# Patient Record
Sex: MALE | Race: WHITE | Employment: OTHER | URBAN - METROPOLITAN AREA
[De-identification: names, ages, dates, MRNs, and addresses within clinical notes are randomized per-mention and may not be internally consistent; named-entity substitution may affect disease eponyms.]

---

## 2017-05-17 PROBLEM — Z12.5 SPECIAL SCREENING FOR MALIGNANT NEOPLASM OF PROSTATE: Status: ACTIVE | Noted: 2017-05-17

## 2017-05-17 PROBLEM — Z79.899 ENCOUNTER FOR LONG-TERM (CURRENT) USE OF MEDICATIONS: Status: ACTIVE | Noted: 2017-05-17

## 2017-05-17 PROBLEM — Z98.890 S/P COLONOSCOPY: Status: ACTIVE | Noted: 2017-05-17

## 2017-11-25 PROBLEM — Z12.5 PROSTATE CANCER SCREENING: Status: ACTIVE | Noted: 2017-05-17

## 2018-05-15 PROCEDURE — 84153 ASSAY OF PSA TOTAL: CPT | Performed by: INTERNAL MEDICINE

## 2018-11-29 ENCOUNTER — HOSPITAL ENCOUNTER (INPATIENT)
Facility: HOSPITAL | Age: 83
LOS: 1 days | Discharge: HOME | DRG: 440 | End: 2018-11-30
Attending: EMERGENCY MEDICINE | Admitting: HOSPITALIST
Payer: MEDICARE

## 2018-11-29 ENCOUNTER — APPOINTMENT (OUTPATIENT)
Dept: CT IMAGING | Facility: HOSPITAL | Age: 83
DRG: 440 | End: 2018-11-29
Attending: EMERGENCY MEDICINE
Payer: MEDICARE

## 2018-11-29 ENCOUNTER — APPOINTMENT (OUTPATIENT)
Dept: ULTRASOUND IMAGING | Facility: HOSPITAL | Age: 83
DRG: 440 | End: 2018-11-29
Attending: INTERNAL MEDICINE
Payer: MEDICARE

## 2018-11-29 ENCOUNTER — APPOINTMENT (OUTPATIENT)
Dept: GENERAL RADIOLOGY | Facility: HOSPITAL | Age: 83
DRG: 440 | End: 2018-11-29
Attending: EMERGENCY MEDICINE
Payer: MEDICARE

## 2018-11-29 DIAGNOSIS — K85.90 ACUTE PANCREATITIS, UNSPECIFIED COMPLICATION STATUS, UNSPECIFIED PANCREATITIS TYPE: Primary | ICD-10-CM

## 2018-11-29 PROCEDURE — 99285 EMERGENCY DEPT VISIT HI MDM: CPT

## 2018-11-29 PROCEDURE — 96361 HYDRATE IV INFUSION ADD-ON: CPT

## 2018-11-29 PROCEDURE — 71045 X-RAY EXAM CHEST 1 VIEW: CPT | Performed by: EMERGENCY MEDICINE

## 2018-11-29 PROCEDURE — 96360 HYDRATION IV INFUSION INIT: CPT

## 2018-11-29 PROCEDURE — 81001 URINALYSIS AUTO W/SCOPE: CPT | Performed by: EMERGENCY MEDICINE

## 2018-11-29 PROCEDURE — 76700 US EXAM ABDOM COMPLETE: CPT | Performed by: INTERNAL MEDICINE

## 2018-11-29 PROCEDURE — 84478 ASSAY OF TRIGLYCERIDES: CPT | Performed by: INTERNAL MEDICINE

## 2018-11-29 PROCEDURE — 83690 ASSAY OF LIPASE: CPT | Performed by: EMERGENCY MEDICINE

## 2018-11-29 PROCEDURE — 80053 COMPREHEN METABOLIC PANEL: CPT | Performed by: EMERGENCY MEDICINE

## 2018-11-29 PROCEDURE — 74177 CT ABD & PELVIS W/CONTRAST: CPT | Performed by: EMERGENCY MEDICINE

## 2018-11-29 PROCEDURE — 84484 ASSAY OF TROPONIN QUANT: CPT | Performed by: EMERGENCY MEDICINE

## 2018-11-29 PROCEDURE — 93010 ELECTROCARDIOGRAM REPORT: CPT

## 2018-11-29 PROCEDURE — 93005 ELECTROCARDIOGRAM TRACING: CPT

## 2018-11-29 PROCEDURE — 85025 COMPLETE CBC W/AUTO DIFF WBC: CPT | Performed by: EMERGENCY MEDICINE

## 2018-11-29 RX ORDER — ONDANSETRON 2 MG/ML
4 INJECTION INTRAMUSCULAR; INTRAVENOUS EVERY 6 HOURS PRN
Status: DISCONTINUED | OUTPATIENT
Start: 2018-11-29 | End: 2018-12-01

## 2018-11-29 RX ORDER — ONDANSETRON 2 MG/ML
4 INJECTION INTRAMUSCULAR; INTRAVENOUS EVERY 4 HOURS PRN
Status: DISCONTINUED | OUTPATIENT
Start: 2018-11-29 | End: 2018-12-01

## 2018-11-29 RX ORDER — SODIUM CHLORIDE 9 MG/ML
INJECTION, SOLUTION INTRAVENOUS CONTINUOUS
Status: DISCONTINUED | OUTPATIENT
Start: 2018-11-29 | End: 2018-12-01

## 2018-11-29 RX ORDER — PANTOPRAZOLE SODIUM 40 MG/1
20 TABLET, DELAYED RELEASE ORAL
COMMUNITY
End: 2021-08-10

## 2018-11-29 RX ORDER — ACETAMINOPHEN 325 MG/1
650 TABLET ORAL EVERY 4 HOURS PRN
Status: DISCONTINUED | OUTPATIENT
Start: 2018-11-29 | End: 2018-12-01

## 2018-11-29 RX ORDER — SODIUM CHLORIDE 9 MG/ML
INJECTION, SOLUTION INTRAVENOUS ONCE
Status: COMPLETED | OUTPATIENT
Start: 2018-11-29 | End: 2018-11-29

## 2018-11-29 RX ORDER — METOCLOPRAMIDE HYDROCHLORIDE 5 MG/ML
5 INJECTION INTRAMUSCULAR; INTRAVENOUS EVERY 8 HOURS PRN
Status: DISCONTINUED | OUTPATIENT
Start: 2018-11-29 | End: 2018-12-01

## 2018-11-29 RX ORDER — HYDROCODONE BITARTRATE AND ACETAMINOPHEN 5; 325 MG/1; MG/1
2 TABLET ORAL EVERY 4 HOURS PRN
Status: DISCONTINUED | OUTPATIENT
Start: 2018-11-29 | End: 2018-12-01

## 2018-11-29 RX ORDER — MELATONIN
200 DAILY
Status: DISCONTINUED | OUTPATIENT
Start: 2018-11-29 | End: 2018-12-01

## 2018-11-29 RX ORDER — PANTOPRAZOLE SODIUM 20 MG/1
20 TABLET, DELAYED RELEASE ORAL
Status: DISCONTINUED | OUTPATIENT
Start: 2018-11-30 | End: 2018-12-01

## 2018-11-29 RX ORDER — POLYETHYLENE GLYCOL 3350 17 G/17G
17 POWDER, FOR SOLUTION ORAL DAILY PRN
Status: DISCONTINUED | OUTPATIENT
Start: 2018-11-29 | End: 2018-12-01

## 2018-11-29 RX ORDER — LISINOPRIL 20 MG/1
40 TABLET ORAL DAILY
COMMUNITY
End: 2019-06-17

## 2018-11-29 RX ORDER — ASPIRIN 81 MG/1
81 TABLET, CHEWABLE ORAL NIGHTLY
Status: DISCONTINUED | OUTPATIENT
Start: 2018-11-29 | End: 2018-12-01

## 2018-11-29 RX ORDER — HEPARIN SODIUM 5000 [USP'U]/ML
5000 INJECTION, SOLUTION INTRAVENOUS; SUBCUTANEOUS EVERY 12 HOURS SCHEDULED
Status: DISCONTINUED | OUTPATIENT
Start: 2018-11-29 | End: 2018-12-01

## 2018-11-29 RX ORDER — BISACODYL 10 MG
10 SUPPOSITORY, RECTAL RECTAL
Status: DISCONTINUED | OUTPATIENT
Start: 2018-11-29 | End: 2018-12-01

## 2018-11-29 RX ORDER — ONDANSETRON 2 MG/ML
INJECTION INTRAMUSCULAR; INTRAVENOUS
Status: COMPLETED
Start: 2018-11-29 | End: 2018-11-29

## 2018-11-29 RX ORDER — MORPHINE SULFATE 4 MG/ML
0.5 INJECTION, SOLUTION INTRAMUSCULAR; INTRAVENOUS EVERY 4 HOURS PRN
Status: DISCONTINUED | OUTPATIENT
Start: 2018-11-29 | End: 2018-12-01

## 2018-11-29 RX ORDER — METOPROLOL SUCCINATE 50 MG/1
50 TABLET, EXTENDED RELEASE ORAL DAILY
Status: DISCONTINUED | OUTPATIENT
Start: 2018-11-29 | End: 2018-12-01

## 2018-11-29 RX ORDER — LEVOTHYROXINE SODIUM 0.05 MG/1
50 TABLET ORAL
Status: DISCONTINUED | OUTPATIENT
Start: 2018-11-30 | End: 2018-12-01

## 2018-11-29 RX ORDER — HYDROCODONE BITARTRATE AND ACETAMINOPHEN 5; 325 MG/1; MG/1
1 TABLET ORAL EVERY 4 HOURS PRN
Status: DISCONTINUED | OUTPATIENT
Start: 2018-11-29 | End: 2018-12-01

## 2018-11-29 RX ORDER — SODIUM CHLORIDE 9 MG/ML
INJECTION, SOLUTION INTRAVENOUS CONTINUOUS
Status: ACTIVE | OUTPATIENT
Start: 2018-11-29 | End: 2018-11-29

## 2018-11-29 RX ORDER — LISINOPRIL 40 MG/1
40 TABLET ORAL DAILY
Status: DISCONTINUED | OUTPATIENT
Start: 2018-11-29 | End: 2018-12-01

## 2018-11-29 RX ORDER — SODIUM PHOSPHATE, DIBASIC AND SODIUM PHOSPHATE, MONOBASIC 7; 19 G/133ML; G/133ML
1 ENEMA RECTAL ONCE AS NEEDED
Status: DISCONTINUED | OUTPATIENT
Start: 2018-11-29 | End: 2018-12-01

## 2018-11-29 RX ORDER — METOCLOPRAMIDE HYDROCHLORIDE 5 MG/ML
10 INJECTION INTRAMUSCULAR; INTRAVENOUS EVERY 8 HOURS PRN
Status: DISCONTINUED | OUTPATIENT
Start: 2018-11-29 | End: 2018-11-29

## 2018-11-29 NOTE — ED PROVIDER NOTES
Patient Seen in: [facility] Emergency Department    History   Patient presents with:  Abdomen/Flank Pain (GI/)  Syncope (cardiovascular, neurologic)    Stated Complaint: abd pain near syncope          80year-old male comes to the hospital the com OTHER      trigger finger release x 2 procedures   • SKIN SURGERY  02/26/2018    Exc - atyp nevus (mod-severe dysplasia) - R paramedian lower mid back           Social History    Tobacco Use      Smoking status: Former Smoker        Packs/day: 1.00 following components:    Glucose 113 (*)     All other components within normal limits   URINALYSIS WITH CULTURE REFLEX - Abnormal; Notable for the following components:    Spec Gravity 1.033 (*)     Ketones Urine 20  (*)     Blood Urine Small (*)     RBC 88cc of Omnipaque 350  FINDINGS:  LUNG BASE:  Atelectasis. Calcified granuloma measuring 5 mm in the left lower lobe LIVER:  Homogeneous enhancement. BILIARY:  No biliary ductal dilatation. PANCREAS:  Homogeneous enhancement.   Round glass inflammatory ch (cpt=71045)    Result Date: 11/29/2018  PROCEDURE:  XR CHEST AP PORTABLE  (CPT=71045)  TECHNIQUE:  AP chest radiograph was obtained. COMPARISON:  MARCELL SHELTON CHEST AP PORTABLE  (CPT=71010), 1/12/2015, 13:11.   JANET, CHEST PA   LATERAL, 12/07/2012, 1

## 2018-11-29 NOTE — CONSULTS
Long Hearn MD    Department of Gastroenterology  [facility]    Dorinda Espinal.  Patient Status:  Inpatient    1932 MRN [id number]   [facility] 0SW-A Attending Wes Lo, [facility] splenic flexure of colon. SPLEEN:  Normal caliber. KIDNEYS:  No hydronephrosis.   There are bilateral renal cysts with slight perinephric soft tissue stranding bilaterally   ADRENALS:  Normal.  AORTA/VASCULAR:  Dense aortoiliac calcification No aneurysm Esophagogastroduodenoscopy, Colonoscopy  SEDATION MEDICATIONS: 5 mg of versed, 50 mcg of fentanyl given in divided doses per protocol  PREPROCEDURE ASSESSMENT: The indication for this procedure is to assess for GERD and Colon cancer screening.  The patient or graft    • Esophageal reflux    • GERD    • Heart attack ([facility] [address] 1994   • High blood pressure    • High cholesterol    • HYPERLIPIDEMIA    • HYPERTENSION    • HYPOTHYROIDISM    • OSTEOPENIA    • SINUSITIS    • Stroke [facility]) jan 2015    tia   • Unspecified mg, Oral, Q4H PRN **OR** HYDROcodone-acetaminophen (NORCO) 5-325 MG per tab 1 tablet, 1 tablet, Oral, Q4H PRN **OR** HYDROcodone-acetaminophen (NORCO) 5-325 MG per tab 2 tablet, 2 tablet, Oral, Q4H PRN  •  ondansetron HCl (ZOFRAN) injection 4 mg, 4 mg, Int change in hair or nails. Bone/joint: No joint pain or inflammation  Heme/Lymphatic: Denies easy bruising, anemia, excessive bleeding, enlarging or painful lymph nodes.   Allergy: Denies medication allergy, latex/rubber allergy, anaphylactic or other reacti gallstones. 3. Check triglycerides. 4. If above negative, will check mrcp / mri pancreas to rule out pancreatic malignancy. Thank you for allowing to participate in the care of this patient.     Vanessa Rader  11/29/2018  5:05 PM

## 2018-11-29 NOTE — ED INITIAL ASSESSMENT (HPI)
Pt here via ems after dispatch called EMS for a stroke. Pt developed weakness and lowered himself to floor and urinated. EMS stating fast score negative. Pt here c/o LUQ pain onset several days ago.  Denies NVD

## 2018-11-29 NOTE — PLAN OF CARE
Patient/Family Goals    • Patient/Family Long Term Goal Progressing    • Patient/Family Short Term Goal Progressing          ivf  NPO  Iv morphine for pain ok with dr. Felipa Bloom  Lipase and labs in am  GI consult called  Heparin SQ

## 2018-11-29 NOTE — H&P
[facility] Hospitalist H&P       CC:  abdominal pain    PCP: Eduardo Rios MD    History of Present Illness: Pt is an 81 yo with CAD, HTN/HL, hypothyroidism, GERD, who is admitted for abdominal pain.   Says for the past 1-2 days, has been having dull const Comment:hypotension  Sulphadimidine Sodi*    OTHER (SEE COMMENTS)    Comment:Unsure reaction     Home Medications:    Outpatient Medications Marked as Taking for the [facility]29/18 [facility] Encounter):  Pantoprazole Sodium 40 MG Oral Tab EC Take 20 mg General:  Alert, NAD, appears stated age   Head:  Normocephalic, without obvious abnormality, atraumatic. Eyes:  Sclera anicteric,  EOMs intact. Lids wnl.  PE   Ears, nose, throat:  external ears and nose within normal limits, hearing intact       Neck: 11/29/2018 at 10:17     Approved by: Eamon Burris MD            Xr Chest Ap Portable  (cpt=71045)    Result Date: 11/29/2018  PROCEDURE:  XR CHEST AP PORTABLE  (CPT=71045)  TECHNIQUE:  AP chest radiograph was obtained.   COMPARISON:  [facility] , XR CHEST AP

## 2018-11-29 NOTE — PROGRESS NOTES
[facility] Pharmacy Note:  Renal Dose Adjustment for Metoclopramide (REGLAN)    Irasema Lou. has been prescribed metoclopramide (REGLAN) 10 mg every 8 hours as needed for N/V. Estimated Creatinine Clearance: 37.6 mL/min (based on SCr of 1.09 mg/dL).     Hi

## 2018-11-29 NOTE — ED NOTES
Patient is resting comfortably. denies current weakness stating he has been lying in bed and is unsure if he feels weak

## 2018-11-30 ENCOUNTER — APPOINTMENT (OUTPATIENT)
Dept: MRI IMAGING | Facility: HOSPITAL | Age: 83
DRG: 440 | End: 2018-11-30
Attending: INTERNAL MEDICINE
Payer: MEDICARE

## 2018-11-30 VITALS
OXYGEN SATURATION: 94 % | HEIGHT: 62 IN | WEIGHT: 159 LBS | RESPIRATION RATE: 16 BRPM | SYSTOLIC BLOOD PRESSURE: 152 MMHG | TEMPERATURE: 98 F | HEART RATE: 84 BPM | DIASTOLIC BLOOD PRESSURE: 77 MMHG | BODY MASS INDEX: 29.26 KG/M2

## 2018-11-30 PROCEDURE — 83690 ASSAY OF LIPASE: CPT | Performed by: HOSPITALIST

## 2018-11-30 PROCEDURE — 80053 COMPREHEN METABOLIC PANEL: CPT | Performed by: HOSPITALIST

## 2018-11-30 PROCEDURE — 74183 MRI ABD W/O CNTR FLWD CNTR: CPT | Performed by: INTERNAL MEDICINE

## 2018-11-30 PROCEDURE — 97162 PT EVAL MOD COMPLEX 30 MIN: CPT

## 2018-11-30 PROCEDURE — A9575 INJ GADOTERATE MEGLUMI 0.1ML: HCPCS

## 2018-11-30 PROCEDURE — 83735 ASSAY OF MAGNESIUM: CPT | Performed by: HOSPITALIST

## 2018-11-30 PROCEDURE — 85025 COMPLETE CBC W/AUTO DIFF WBC: CPT | Performed by: HOSPITALIST

## 2018-11-30 RX ORDER — TRAMADOL HYDROCHLORIDE 50 MG/1
50 TABLET ORAL EVERY 6 HOURS PRN
Status: DISCONTINUED | OUTPATIENT
Start: 2018-11-30 | End: 2018-12-01

## 2018-11-30 NOTE — PROGRESS NOTES
[facility]  Progress Note    Guillermo Naranjo.  Patient Status:  Inpatient    1932 MRN [id number]   [facility] 0SW-A Attending Chriss Sheehan, *   Hosp Day # 1 PCP Yara Guaman MD     Subjective:  No further abdominal com Coronary atherosclerosis of unspecified type of vessel, native or graft    • Esophageal reflux    • GERD    • Heart attack ([facility] [address] 1994   • High blood pressure    • High cholesterol    • HYPERLIPIDEMIA    • HYPERTENSION    • HYPOTHYROIDISM    • OSTEOPENIA CREATSERUM 1.09 11/30/2018    BUN 15 11/30/2018     11/30/2018    K 4.2 11/30/2018     11/30/2018    CO2 27.0 11/30/2018    GLU 53 11/30/2018    CA 8.9 11/30/2018    ALB 2.9 11/30/2018    ALKPHO 53 11/30/2018    BILT 1.6 11/30/2018    TP 6.3 11 MRCP ordered. 2. Further recommendations pending above.     Melissa Hull  11/30/2018  10:45 AM

## 2018-11-30 NOTE — PROGRESS NOTES
Received patient this am alert and oriented,very anxious about timing of MRI and pending discharge.   Lungs clear bilaterally  On RA  Up to bathroom with stand by assist  No complaints of pain  Tolerating clears,patient refusing to advance diet until after

## 2018-11-30 NOTE — PLAN OF CARE
Assumed care for this pt 1930. Pt alert and oriented x4. Pt npo at midnight with anticipation of possible to procedure. Pt denies any pain at this time, pt up with stand by assist to toilet.  No other complaints at this time will continue to follow

## 2018-11-30 NOTE — PROGRESS NOTES
[facility] Hospitalist Progress Note     PCP: Pedrito Barboza MD    CC:  Follow up    SUBJECTIVE:   Pt sitting up in bed, no complaints of abdominal pain currently. Says he is frustrate that he has to wait for MRI/MRCP when he wants to go home.   Does not want to • lisinopril  40 mg Oral Daily   • Metoprolol Succinate ER  50 mg Oral Daily   • Pantoprazole Sodium  20 mg Oral QAM AC     • sodium chloride 125 mL/hr at 11/30/18 0200     ondansetron HCl, acetaminophen **OR** HYDROcodone-acetaminophen **OR** HYDROcodon

## 2018-11-30 NOTE — PHYSICAL THERAPY NOTE
PHYSICAL THERAPY QUICK EVALUATION - INPATIENT    Room Number: 8502/0710-S  Evaluation Date: 11/30/2018  Presenting Problem: acute pancreatitis  Physician Order: PT Eval and Treat    Problem List  Principal Problem:    Acute pancreatitis, unspecified comp BEARING RESTRICTION                   PAIN ASSESSMENT  Ratin         RANGE OF MOTION AND STRENGTH ASSESSMENT  Upper extremity ROM and strength are within functional limits     Lower extremity ROM is within functional limits     Lower extremity strength comorbidities and personal factors impacting therapy include none. Functional outcome measures completed include [facility]. Based on this evaluation, patient's clinical presentation is evolving and overall evaluation complexity is considered moderate.   PT Simone Fang

## 2019-01-28 ENCOUNTER — APPOINTMENT (OUTPATIENT)
Dept: GENERAL RADIOLOGY | Facility: HOSPITAL | Age: 84
DRG: 087 | End: 2019-01-28
Attending: EMERGENCY MEDICINE
Payer: MEDICARE

## 2019-01-28 ENCOUNTER — HOSPITAL ENCOUNTER (INPATIENT)
Facility: HOSPITAL | Age: 84
LOS: 1 days | Discharge: HOME | DRG: 087 | End: 2019-01-29
Attending: EMERGENCY MEDICINE | Admitting: INTERNAL MEDICINE
Payer: MEDICARE

## 2019-01-28 ENCOUNTER — APPOINTMENT (OUTPATIENT)
Dept: CT IMAGING | Facility: HOSPITAL | Age: 84
DRG: 087 | End: 2019-01-28
Attending: EMERGENCY MEDICINE
Payer: MEDICARE

## 2019-01-28 DIAGNOSIS — S06.5X9A ACUTE SUBDURAL HEMATOMA (HCC): ICD-10-CM
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
## 2019-01-28 PROBLEM — S06.5XAA ACUTE SUBDURAL HEMATOMA (HCC): Status: ACTIVE | Noted: 2019-01-28

## 2019-01-28 PROBLEM — S06.5XAA ACUTE SUBDURAL HEMATOMA: Status: ACTIVE | Noted: 2019-01-28

## 2019-01-28 LAB
ALBUMIN SERPL-MCNC: 4.1 G/DL (ref 3.1–4.5)
ALBUMIN/GLOB SERPL: 1.2 {RATIO} (ref 1–2)
ALP LIVER SERPL-CCNC: 59 U/L (ref 45–117)
ALT SERPL-CCNC: 28 U/L (ref 17–63)
ANION GAP SERPL CALC-SCNC: 8 MMOL/L (ref 0–18)
APTT PPP: 30.2 SECONDS (ref 26.1–34.6)
AST SERPL-CCNC: 35 U/L (ref 15–41)
BASOPHILS # BLD AUTO: 0.06 X10(3) UL (ref 0–0.1)
BASOPHILS NFR BLD AUTO: 0.7 %
BILIRUB SERPL-MCNC: 1.4 MG/DL (ref 0.1–2)
BUN BLD-MCNC: 12 MG/DL (ref 8–20)
BUN/CREAT SERPL: 11.3 (ref 10–20)
CALCIUM BLD-MCNC: 10 MG/DL (ref 8.3–10.3)
CHLORIDE SERPL-SCNC: 105 MMOL/L (ref 101–111)
CO2 SERPL-SCNC: 26 MMOL/L (ref 22–32)
CREAT BLD-MCNC: 1.06 MG/DL (ref 0.7–1.3)
EOSINOPHIL # BLD AUTO: 0.21 X10(3) UL (ref 0–0.3)
EOSINOPHIL NFR BLD AUTO: 2.5 %
ERYTHROCYTE [DISTWIDTH] IN BLOOD BY AUTOMATED COUNT: 16.1 % (ref 11.5–16)
GLOBULIN PLAS-MCNC: 3.3 G/DL (ref 2.8–4.4)
GLUCOSE BLD-MCNC: 99 MG/DL (ref 70–99)
HCT VFR BLD AUTO: 46.9 % (ref 37–53)
HGB BLD-MCNC: 15 G/DL (ref 13–17)
IMMATURE GRANULOCYTE COUNT: 0.03 X10(3) UL (ref 0–1)
IMMATURE GRANULOCYTE RATIO %: 0.4 %
INR BLD: 1.01 (ref 0.9–1.1)
LYMPHOCYTES # BLD AUTO: 1.42 X10(3) UL (ref 0.9–4)
LYMPHOCYTES NFR BLD AUTO: 16.8 %
M PROTEIN MFR SERPL ELPH: 7.4 G/DL (ref 6.4–8.2)
MCH RBC QN AUTO: 23 PG (ref 27–33.2)
MCHC RBC AUTO-ENTMCNC: 32 G/DL (ref 31–37)
MCV RBC AUTO: 72 FL (ref 80–99)
MONOCYTES # BLD AUTO: 0.78 X10(3) UL (ref 0.1–1)
MONOCYTES NFR BLD AUTO: 9.2 %
NEUTROPHIL ABS PRELIM: 5.96 X10 (3) UL (ref 1.3–6.7)
NEUTROPHILS # BLD AUTO: 5.96 X10(3) UL (ref 1.3–6.7)
NEUTROPHILS NFR BLD AUTO: 70.4 %
OSMOLALITY SERPL CALC.SUM OF ELEC: 288 MOSM/KG (ref 275–295)
PLATELET # BLD AUTO: 177 10(3)UL (ref 150–450)
POTASSIUM SERPL-SCNC: 4.5 MMOL/L (ref 3.6–5.1)
PSA SERPL DL<=0.01 NG/ML-MCNC: 13.7 SECONDS (ref 12.4–14.7)
RBC # BLD AUTO: 6.51 X10(6)UL (ref 3.8–5.8)
RED CELL DISTRIBUTION WIDTH-SD: 37 FL (ref 35.1–46.3)
SODIUM SERPL-SCNC: 139 MMOL/L (ref 136–144)
WBC # BLD AUTO: 8.5 X10(3) UL (ref 4–13)

## 2019-01-28 PROCEDURE — 72040 X-RAY EXAM NECK SPINE 2-3 VW: CPT | Performed by: EMERGENCY MEDICINE

## 2019-01-28 PROCEDURE — 70450 CT HEAD/BRAIN W/O DYE: CPT | Performed by: EMERGENCY MEDICINE

## 2019-01-28 PROCEDURE — 99223 1ST HOSP IP/OBS HIGH 75: CPT | Performed by: NEUROLOGICAL SURGERY

## 2019-01-28 RX ORDER — LISINOPRIL 40 MG/1
40 TABLET ORAL DAILY
Status: DISCONTINUED | OUTPATIENT
Start: 2019-01-29 | End: 2019-01-29

## 2019-01-28 RX ORDER — SODIUM CHLORIDE 9 MG/ML
INJECTION, SOLUTION INTRAVENOUS CONTINUOUS
Status: DISCONTINUED | OUTPATIENT
Start: 2019-01-28 | End: 2019-01-29

## 2019-01-28 RX ORDER — ONDANSETRON 2 MG/ML
4 INJECTION INTRAMUSCULAR; INTRAVENOUS EVERY 6 HOURS PRN
Status: DISCONTINUED | OUTPATIENT
Start: 2019-01-28 | End: 2019-01-29

## 2019-01-28 RX ORDER — PANTOPRAZOLE SODIUM 20 MG/1
20 TABLET, DELAYED RELEASE ORAL
Status: DISCONTINUED | OUTPATIENT
Start: 2019-01-29 | End: 2019-01-29

## 2019-01-28 RX ORDER — MELATONIN
200 DAILY
Status: DISCONTINUED | OUTPATIENT
Start: 2019-01-29 | End: 2019-01-29

## 2019-01-28 RX ORDER — METOPROLOL SUCCINATE 50 MG/1
50 TABLET, EXTENDED RELEASE ORAL DAILY
Status: DISCONTINUED | OUTPATIENT
Start: 2019-01-29 | End: 2019-01-29

## 2019-01-28 RX ORDER — METOCLOPRAMIDE HYDROCHLORIDE 5 MG/ML
5 INJECTION INTRAMUSCULAR; INTRAVENOUS EVERY 8 HOURS PRN
Status: DISCONTINUED | OUTPATIENT
Start: 2019-01-28 | End: 2019-01-29

## 2019-01-28 RX ORDER — AMLODIPINE BESYLATE 2.5 MG/1
2.5 TABLET ORAL DAILY
Status: DISCONTINUED | OUTPATIENT
Start: 2019-01-29 | End: 2019-01-29

## 2019-01-28 RX ORDER — LEVOTHYROXINE SODIUM 0.07 MG/1
75 TABLET ORAL
Status: DISCONTINUED | OUTPATIENT
Start: 2019-01-28 | End: 2019-01-29

## 2019-01-28 RX ORDER — ACETAMINOPHEN 325 MG/1
650 TABLET ORAL EVERY 6 HOURS PRN
Status: DISCONTINUED | OUTPATIENT
Start: 2019-01-28 | End: 2019-01-29

## 2019-01-28 NOTE — H&P
[facility] Hospitalist H&P       CC: Patient presents with:  Fall (musculoskeletal, neurologic)       PCP: Rosario Rios MD    History of Present Illness:  Mr. Airam Barriga is an 79 yo male with PMH of CAD, GERD, HTN, HLD, hypothyroidism, prior TIA who present mid back        ALL:    Hydrochlorothiazide     RASH    Comment:Skin rash with thiazide diuretic  Morphine [Morphine *    OTHER (SEE COMMENTS)    Comment:hypotension  Sulphadimidine Sodi*    OTHER (SEE COMMENTS)    Comment:Unsure reaction     Home Medicati (5' 2\")   Wt 160 lb (72.6 kg)   SpO2 98%   BMI 29.26 kg/m²     BP Readings from Last 3 Encounters:  01/28/19 : 151/56  01/21/19 : 130/60  12/20/18 : 130/60    Wt Readings from Last 3 Encounters:  01/28/19 : 160 lb (72.6 kg)  12/20/18 : 160 lb (72.6 kg)  1 high density along the right tentorium with a more focal, 1.3 x 1.0 x 0.6 cm collection most consistent with a subdural hematoma. There is no midline shift. No acute intracranial hemorrhage.  Periventricular and subcortical low attenuation are nonspecific who presented to the ED with fall.     # SDH  - Neurosurgery c/s  - Neuro checks q 4 hours  - PT/OT  - Repeat CT head in AM --> sooner if change in neuro status  - No ASA for at least 5 days after fall    # Hx CAD / HTN  - Continue metoprolol  - Continue li

## 2019-01-28 NOTE — CONSULTS
[facility] Pharmacy Note:  Renal Dose Adjustment for Metoclopramide (REGLAN)    Joyce Anderson. has been prescribed Metoclopramide (REGLAN) 10 mg every 8 hours as needed for n/v.    Estimated Creatinine Clearance: 38.6 mL/min (based on SCr of 1.06 mg/dL).     Hi

## 2019-01-28 NOTE — ED PROVIDER NOTES
Patient Seen in: [facility] Emergency Department    History   Patient presents with:  Fall (musculoskeletal, neurologic)    Stated Complaint: fall    HPI    Patient presents to the emergency department after he had a fall yesterday while snowblowing OTHER      trigger finger release x 2 procedures   • SKIN SURGERY  02/26/2018    Exc - atyp nevus (mod-severe dysplasia) - R paramedian lower mid back           Social History    Tobacco Use      Smoking status: Former Smoker        Packs/day: 1.00 Abnormality         Status                     ---------                               -----------         ------                     CBC W/ DIFFERENTIAL[[id number]]                              In process                   Pleas hematoma     measuring approximately 6 mm in depth along the right tentorium as     detailed above. Critical findings discussed with the ED MD, Dr. Latonia Casas at approximately     1155 hr on 1/28/2019. Read back performed.          2. Findings most con diagnosis)  Acute subdural hematoma (HCC)    Disposition:  Admit  1/28/2019 12:44 pm    Follow-up:  No follow-up provider specified.       Medications Prescribed:  Current Discharge Medication List        Present on Admission  Date Reviewed: 12/20/2018

## 2019-01-28 NOTE — CONSULTS
[facility]  Neurosurgery Consult    Carlipapi Eedn.  Patient Status:  Emergency    1932 MRN [id number]   Location [address] [facility] Attending Az Zafar MD   Hosp Day # 0 PCP John Jaeger MD     [facility] ESOPHAGOGASTRODUODENOSCOPY (EGD) N/A 10/27/2014    Performed by Shannon Krishnan MD at [facility] ENDOSCOPY   • OTHER      trigger finger release x 2 procedures   • SKIN SURGERY  02/26/2018    Exc - atyp nevus (mod-severe dysplasia) - R paramedian lower mid back CREATSERUM 1.06 01/28/2019    BUN 12 01/28/2019     01/28/2019    K 4.5 01/28/2019     01/28/2019    CO2 26.0 01/28/2019    GLU 99 01/28/2019    CA 10.0 01/28/2019    ALB 4.1 01/28/2019    ALKPHO 59 01/28/2019    BILT 1.4 01/28/2019    TP 7.4 0 this patient and agree with the findings documented above. Fall the day before admission with mild headache, unsteadiness when standing. Small tentorial SDH without significant mass effect, no MLS, basal cisterns patent.   Repeat CT in AM.     Amanda Clark

## 2019-01-28 NOTE — ED INITIAL ASSESSMENT (HPI)
Pt here s/p fall yesterday. Pt slipped in driveway while snow blowing. Pt fell on his side. Pt notes pain in head and neck.

## 2019-01-29 ENCOUNTER — APPOINTMENT (OUTPATIENT)
Dept: CT IMAGING | Facility: HOSPITAL | Age: 84
DRG: 087 | End: 2019-01-29
Attending: NURSE PRACTITIONER
Payer: MEDICARE

## 2019-01-29 VITALS
BODY MASS INDEX: 29.44 KG/M2 | HEIGHT: 62 IN | WEIGHT: 160 LBS | RESPIRATION RATE: 15 BRPM | DIASTOLIC BLOOD PRESSURE: 52 MMHG | OXYGEN SATURATION: 97 % | SYSTOLIC BLOOD PRESSURE: 147 MMHG | HEART RATE: 62 BPM | TEMPERATURE: 98 F

## 2019-01-29 LAB
ALBUMIN SERPL-MCNC: 3.2 G/DL (ref 3.1–4.5)
ALBUMIN/GLOB SERPL: 1.1 {RATIO} (ref 1–2)
ALP LIVER SERPL-CCNC: 49 U/L (ref 45–117)
ALT SERPL-CCNC: 20 U/L (ref 17–63)
ANION GAP SERPL CALC-SCNC: 4 MMOL/L (ref 0–18)
AST SERPL-CCNC: 20 U/L (ref 15–41)
BILIRUB SERPL-MCNC: 1 MG/DL (ref 0.1–2)
BUN BLD-MCNC: 13 MG/DL (ref 8–20)
BUN/CREAT SERPL: 11.6 (ref 10–20)
CALCIUM BLD-MCNC: 8.7 MG/DL (ref 8.3–10.3)
CHLORIDE SERPL-SCNC: 107 MMOL/L (ref 101–111)
CO2 SERPL-SCNC: 29 MMOL/L (ref 22–32)
CREAT BLD-MCNC: 1.12 MG/DL (ref 0.7–1.3)
GLOBULIN PLAS-MCNC: 2.8 G/DL (ref 2.8–4.4)
GLUCOSE BLD-MCNC: 81 MG/DL (ref 70–99)
M PROTEIN MFR SERPL ELPH: 6 G/DL (ref 6.4–8.2)
OSMOLALITY SERPL CALC.SUM OF ELEC: 289 MOSM/KG (ref 275–295)
POTASSIUM SERPL-SCNC: 4.2 MMOL/L (ref 3.6–5.1)
SODIUM SERPL-SCNC: 140 MMOL/L (ref 136–144)

## 2019-01-29 PROCEDURE — 70450 CT HEAD/BRAIN W/O DYE: CPT | Performed by: NURSE PRACTITIONER

## 2019-01-29 PROCEDURE — 99223 1ST HOSP IP/OBS HIGH 75: CPT | Performed by: NEUROLOGICAL SURGERY

## 2019-01-29 NOTE — DISCHARGE SUMMARY
General Medicine Discharge Summary     Patient ID:  Lois Loss.  80year old  7/22/1932    Admit date: 1/28/2019    Discharge date and time: 1/29/19    Attending Physician: Kerry Crowell MD     Primary Care Physician: Scott Roca MD     Re MCG Oral Tab  Take 200 mcg by mouth daily. Multiple Vitamins-Minerals (TAB-A-MARCELLE MAXIMUM) Oral Tab  Take 1 tablet by mouth daily. Ezetimibe-Simvastatin (VYTORIN) 10-40 MG Oral Tab  Take 1 tablet by mouth nightly.       Follow-up with   PCP  Neurosurg

## 2019-01-29 NOTE — OCCUPATIONAL THERAPY NOTE
OCCUPATIONAL THERAPY QUICK EVALUATION - INPATIENT    Room Number: 7392/5596-V  Evaluation Date: 1/29/2019     Type of Evaluation: Quick Eval  Presenting Problem: fall, SDH    Physician Order: IP Consult to Occupational Therapy  Reason for Therapy:  ADL/IAD by Shannon Krishnan MD at [facility] ENDOSCOPY   • ESOPHAGOGASTRODUODENOSCOPY (EGD) N/A 10/27/2014    Performed by Shannon Krishnan MD at [facility] ENDOSCOPY   • OTHER      trigger finger release x 2 procedures   • SKIN SURGERY  02/26/2018    Exc - atyp nevus (mod-severe dysplasia ASSESSMENT  Supine to Sit : Independent  Sit to Stand: Independent    Skilled Therapy Provided: Independent with bed mobility, transfers, and ambulation without device. Pt completed toileting while standing independently.   Pt maintained dynamic standing b

## 2019-01-29 NOTE — PHYSICAL THERAPY NOTE
PHYSICAL THERAPY QUICK EVALUATION - INPATIENT    Room Number: 5662/5584-N  Evaluation Date: 1/29/2019  Presenting Problem: acute subdural hematoma; fell on ice using snowblower  Physician Order: PT Eval and Treat    Problem List  Principal Problem:     Fa Standard fall risk    WEIGHT BEARING RESTRICTION  Weight Bearing Restriction: None                PAIN ASSESSMENT  Ratin         RANGE OF MOTION AND STRENGTH ASSESSMENT  Upper extremity ROM and strength are within functional limits     Lower extremity light within reach; All patient questions and concerns addressed;SCDs in place;RN aware of session/findings    ASSESSMENT   Patient is a 80year old male admitted on 1/28/2019 for acute subdural hematoma from fall.   Pertinent comorbidities and personal fact

## 2019-01-29 NOTE — PLAN OF CARE
Assumed care @ 1400. A&Ox4, RA, SR, denies pain. Up with assist.  PT/OT will work with patient tomorrow. CTH showed acute subdural hematoma along right tentorium. Neuro's intact. Neurosurgery will not intervene at this time. Call light within reach.

## 2019-01-29 NOTE — PLAN OF CARE
NEUROLOGICAL - ADULT    • Achieves stable or improved neurological status Adequate for Discharge          All consults signed off on patient. Discharge AVS printed and reviewed with patient. He is instructed to hold his ASA for 5 days, resume on 2/5.  Deisi Cassidy

## 2019-01-29 NOTE — PLAN OF CARE
Assumed care at 1900. Alert and oriented x4. Telemetry monitor reading SR. IVF infusing assessed and maintained. Neuro checks remain unchanged. Fall precautions maintained per protocol. Plan for repeat CT head in the am at 0700.  Call light in reach at the

## 2019-01-29 NOTE — PROGRESS NOTES
[facility]  Neurosurgery Progress Note    Cash Cutter.  Patient Status:  Inpatient    1932 MRN [id number]   [facility] 7NE-A Attending Kya Brooks MD   [facility] Day # 1 PCP Nadia Prieto MD     Subjective:  Pt examined, male with a right tentorial SDH after a fall    Plan:  Discussed with  on rounds  No aspirin for 5 days after rounds  Ok from neurosurgery standpoint to discharge home  F/u 2 appt as scheduled      SILVERIO Dugan  THE [facility] Neuroscience [facility]

## 2019-01-29 NOTE — CM/SW NOTE
Pt is an 81 yo male admitted for acute subdural hematoma from falling on the ice outside. Pt lives with his wife in [place]. PT/OT recommending Home. Pt maybe d/c'd home today - no needs.       01/29/19 1400   CM/SW Screening   Referral Source Social

## 2019-01-30 NOTE — CM/SW NOTE
01/30/19 0900   Discharge disposition   Expected discharge disposition Home or Self   Name of [address] [place] [address] services after discharge None   Discharge transportation Private car

## 2019-02-27 ENCOUNTER — OFFICE VISIT (OUTPATIENT)
Dept: SURGERY | Facility: CLINIC | Age: 84
End: 2019-02-27
Payer: MEDICARE

## 2019-02-27 VITALS
SYSTOLIC BLOOD PRESSURE: 130 MMHG | WEIGHT: 160 LBS | HEIGHT: 63 IN | DIASTOLIC BLOOD PRESSURE: 78 MMHG | HEART RATE: 70 BPM | BODY MASS INDEX: 28.35 KG/M2
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
## 2019-02-27 DIAGNOSIS — S06.5X9A ACUTE SUBDURAL HEMATOMA (HCC): ICD-10-CM

## 2019-02-27 PROCEDURE — 99213 OFFICE O/P EST LOW 20 MIN: CPT | Performed by: PHYSICIAN ASSISTANT

## 2019-02-27 PROCEDURE — 1111F DSCHRG MED/CURRENT MED MERGE: CPT | Performed by: PHYSICIAN ASSISTANT

## 2019-02-27 NOTE — PROGRESS NOTES
Hospital follow up for subdural hematoma. . Pt states that he has been doing better since LOV.    Hospital date: 1/28/19

## 2019-02-27 NOTE — PROGRESS NOTES
Neurosurgery Clinic Visit  2019    Codion Tegan. PCP:  Ruby Bailey MD    1932 MRN [id number]       CC:  SDH F/u Hospital Visit 19    HPI:    [employer] is here for 1 month f/u after SDH. He is doing well.   He denies any headache o x 3 procedures     no stents   • CAROTID ENDARTERECTOMY Right     • CAROTID ENDARTERECTOMY Right 2/12/2015     Performed by Elma Joseph MD at [address] [place] [address] [address] [facility] Bilateral 2012     lens implants   • COLONOSCOPY   11/04   • COLONOSCOPY N/A 10/27/2014   Finger abduction     Right 5 5 5 5 5     Left 5 5 5 5 5   Lower extremity strength:        Iliospoas  Hamstrings  Quads  D-flexion  P-flexion     Right 5 5 5 5 5     Left 5 5 5 5 5         IMAGING:    CT Brain -     Persistent subdural hematoma along

## 2019-03-18 ENCOUNTER — APPOINTMENT (OUTPATIENT)
Dept: GENERAL RADIOLOGY | Facility: HOSPITAL | Age: 84
DRG: 027 | End: 2019-03-18
Attending: EMERGENCY MEDICINE
Payer: MEDICARE

## 2019-03-18 ENCOUNTER — HOSPITAL ENCOUNTER (INPATIENT)
Facility: HOSPITAL | Age: 84
LOS: 6 days | Discharge: HOME | DRG: 027 | End: 2019-03-25
Attending: EMERGENCY MEDICINE | Admitting: INTERNAL MEDICINE
Payer: MEDICARE

## 2019-03-18 DIAGNOSIS — I62.01 ACUTE ON CHRONIC INTRACRANIAL SUBDURAL HEMATOMA (HCC): Primary | ICD-10-CM

## 2019-03-18 DIAGNOSIS — I62.03 ACUTE ON CHRONIC INTRACRANIAL SUBDURAL HEMATOMA (HCC): Primary | ICD-10-CM

## 2019-03-18 PROCEDURE — 71045 X-RAY EXAM CHEST 1 VIEW: CPT | Performed by: EMERGENCY MEDICINE

## 2019-03-19 ENCOUNTER — APPOINTMENT (OUTPATIENT)
Dept: MRI IMAGING | Facility: HOSPITAL | Age: 84
DRG: 027 | End: 2019-03-19
Attending: EMERGENCY MEDICINE
Payer: MEDICARE

## 2019-03-19 ENCOUNTER — APPOINTMENT (OUTPATIENT)
Dept: ULTRASOUND IMAGING | Facility: HOSPITAL | Age: 84
DRG: 027 | End: 2019-03-19
Attending: NURSE PRACTITIONER
Payer: MEDICARE

## 2019-03-19 ENCOUNTER — APPOINTMENT (OUTPATIENT)
Dept: CT IMAGING | Facility: HOSPITAL | Age: 84
DRG: 027 | End: 2019-03-19
Attending: EMERGENCY MEDICINE
Payer: MEDICARE

## 2019-03-19 PROBLEM — I62.03 ACUTE ON CHRONIC INTRACRANIAL SUBDURAL HEMATOMA (HCC): Status: ACTIVE | Noted: 2019-03-19

## 2019-03-19 PROBLEM — I62.01 ACUTE ON CHRONIC INTRACRANIAL SUBDURAL HEMATOMA (HCC): Status: ACTIVE | Noted: 2019-03-19

## 2019-03-19 PROCEDURE — 99291 CRITICAL CARE FIRST HOUR: CPT | Performed by: NURSE PRACTITIONER

## 2019-03-19 PROCEDURE — 70553 MRI BRAIN STEM W/O & W/DYE: CPT | Performed by: EMERGENCY MEDICINE

## 2019-03-19 PROCEDURE — 99291 CRITICAL CARE FIRST HOUR: CPT | Performed by: OTHER

## 2019-03-19 PROCEDURE — 99222 1ST HOSP IP/OBS MODERATE 55: CPT | Performed by: NURSE PRACTITIONER

## 2019-03-19 PROCEDURE — 70549 MR ANGIOGRAPH NECK W/O&W/DYE: CPT | Performed by: EMERGENCY MEDICINE

## 2019-03-19 PROCEDURE — 70546 MR ANGIOGRAPH HEAD W/O&W/DYE: CPT | Performed by: EMERGENCY MEDICINE

## 2019-03-19 PROCEDURE — 93880 EXTRACRANIAL BILAT STUDY: CPT | Performed by: NURSE PRACTITIONER

## 2019-03-19 PROCEDURE — 95816 EEG AWAKE AND DROWSY: CPT | Performed by: OTHER

## 2019-03-19 PROCEDURE — 70450 CT HEAD/BRAIN W/O DYE: CPT | Performed by: EMERGENCY MEDICINE

## 2019-03-19 RX ORDER — FAMOTIDINE 20 MG/1
20 TABLET ORAL DAILY
Status: DISCONTINUED | OUTPATIENT
Start: 2019-03-19 | End: 2019-03-25

## 2019-03-19 RX ORDER — METOPROLOL SUCCINATE 50 MG/1
50 TABLET, EXTENDED RELEASE ORAL DAILY
Status: DISCONTINUED | OUTPATIENT
Start: 2019-03-19 | End: 2019-03-25

## 2019-03-19 RX ORDER — SENNOSIDES 8.6 MG
17.2 TABLET ORAL NIGHTLY
Status: DISCONTINUED | OUTPATIENT
Start: 2019-03-19 | End: 2019-03-25

## 2019-03-19 RX ORDER — BISACODYL 10 MG
10 SUPPOSITORY, RECTAL RECTAL
Status: DISCONTINUED | OUTPATIENT
Start: 2019-03-19 | End: 2019-03-25

## 2019-03-19 RX ORDER — METOCLOPRAMIDE HYDROCHLORIDE 5 MG/ML
5 INJECTION INTRAMUSCULAR; INTRAVENOUS EVERY 8 HOURS PRN
Status: DISCONTINUED | OUTPATIENT
Start: 2019-03-19 | End: 2019-03-25

## 2019-03-19 RX ORDER — LEVOTHYROXINE SODIUM 0.07 MG/1
75 TABLET ORAL DAILY
Status: DISCONTINUED | OUTPATIENT
Start: 2019-03-19 | End: 2019-03-25

## 2019-03-19 RX ORDER — DOCUSATE SODIUM 100 MG/1
100 CAPSULE, LIQUID FILLED ORAL 2 TIMES DAILY
Status: DISCONTINUED | OUTPATIENT
Start: 2019-03-19 | End: 2019-03-25

## 2019-03-19 RX ORDER — ONDANSETRON 2 MG/ML
4 INJECTION INTRAMUSCULAR; INTRAVENOUS EVERY 6 HOURS PRN
Status: DISCONTINUED | OUTPATIENT
Start: 2019-03-19 | End: 2019-03-25

## 2019-03-19 RX ORDER — LEVETIRACETAM 500 MG/1
500 TABLET ORAL 2 TIMES DAILY
Status: DISCONTINUED | OUTPATIENT
Start: 2019-03-19 | End: 2019-03-25

## 2019-03-19 RX ORDER — ACETAMINOPHEN 650 MG/1
650 SUPPOSITORY RECTAL EVERY 4 HOURS PRN
Status: DISCONTINUED | OUTPATIENT
Start: 2019-03-19 | End: 2019-03-25

## 2019-03-19 RX ORDER — PHENYLEPHRINE HCL IN 0.9% NACL 50MG/250ML
PLASTIC BAG, INJECTION (ML) INTRAVENOUS CONTINUOUS PRN
Status: DISCONTINUED | OUTPATIENT
Start: 2019-03-19 | End: 2019-03-19 | Stop reason: HOSPADM

## 2019-03-19 RX ORDER — SODIUM CHLORIDE 9 MG/ML
INJECTION, SOLUTION INTRAVENOUS CONTINUOUS
Status: DISCONTINUED | OUTPATIENT
Start: 2019-03-19 | End: 2019-03-23

## 2019-03-19 RX ORDER — FAMOTIDINE 10 MG/ML
20 INJECTION, SOLUTION INTRAVENOUS DAILY
Status: DISCONTINUED | OUTPATIENT
Start: 2019-03-19 | End: 2019-03-25

## 2019-03-19 RX ORDER — EZETIMIBE AND SIMVASTATIN 10; 40 MG/1; MG/1
1 TABLET ORAL NIGHTLY
Status: DISCONTINUED | OUTPATIENT
Start: 2019-03-19 | End: 2019-03-19 | Stop reason: ALTCHOICE

## 2019-03-19 RX ORDER — ACETAMINOPHEN 325 MG/1
650 TABLET ORAL EVERY 4 HOURS PRN
Status: DISCONTINUED | OUTPATIENT
Start: 2019-03-19 | End: 2019-03-25

## 2019-03-19 RX ORDER — LISINOPRIL 40 MG/1
40 TABLET ORAL DAILY
Status: DISCONTINUED | OUTPATIENT
Start: 2019-03-19 | End: 2019-03-25

## 2019-03-19 RX ORDER — LORAZEPAM 2 MG/ML
1 INJECTION INTRAMUSCULAR
Status: DISCONTINUED | OUTPATIENT
Start: 2019-03-19 | End: 2019-03-25

## 2019-03-19 RX ORDER — POLYETHYLENE GLYCOL 3350 17 G/17G
17 POWDER, FOR SOLUTION ORAL DAILY PRN
Status: DISCONTINUED | OUTPATIENT
Start: 2019-03-19 | End: 2019-03-25

## 2019-03-19 RX ORDER — LABETALOL HYDROCHLORIDE 5 MG/ML
10 INJECTION, SOLUTION INTRAVENOUS EVERY 10 MIN PRN
Status: COMPLETED | OUTPATIENT
Start: 2019-03-19 | End: 2019-03-24

## 2019-03-19 RX ORDER — SODIUM PHOSPHATE, DIBASIC AND SODIUM PHOSPHATE, MONOBASIC 7; 19 G/133ML; G/133ML
1 ENEMA RECTAL ONCE AS NEEDED
Status: DISCONTINUED | OUTPATIENT
Start: 2019-03-19 | End: 2019-03-25

## 2019-03-19 NOTE — OCCUPATIONAL THERAPY NOTE
OCCUPATIONAL THERAPY EVALUATION - INPATIENT     Room Number: 9829/8404-L  Evaluation Date: 3/19/2019  Type of Evaluation: Initial  Presenting Problem: transient LUE numbness and slurred speech, acute on chronic SDH    Physician Order: IP Consult to Michelle Szymanski lower mid back   • TONSILLECTOMY         OCCUPATIONAL PROFILE    HOME SITUATION  Type of Home: House  Home Layout: One level(with basement)  Lives With: Spouse    Toilet and Equipment: Standard height toilet  Shower/Tub and Equipment: Walk-in shower; Shower Opposition     Coordination - Rapid Alternating Movement: Symmetrical  Coordination - Finger Opposition: Symmetrical       ACTIVITY TOLERANCE                         O2 SATURATIONS                ACTIVITIES OF DAILY LIVING ASSESSMENT  AM-PAC ‘6-Clicks’ Inp above. Functional outcome measures completed include AMPAC, ROM, MMT. The AM-JASWINDER ' '6-Clicks' Inpatient Daily Activity Short Form was completed and  this patient  is demonstrating a  16% degree impairment in activities of daily living.  Research supports th Goal  Patient will be modified independent with left AROM HEP (home exercise program).     Additional Goals:  Pt will complete dynamometer strength testing  Pt will complete trailmaking test  Pt will complete clock draw

## 2019-03-19 NOTE — CONSULTS
[facility]  Neurosurgery Consult    Blessing Avalos.  Patient Status:  Inpatient    1932 MRN [id number]   [facility] 6NE-A Attending Ben Guevara MD   Hosp Day # 0 PCP Janeen Garcia MD     REASON FOR CONSULTATION: at [facility] [address] Bilateral 2012    lens implants   • COLONOSCOPY  11/04   • COLONOSCOPY N/A 10/27/2014    Performed by Pro Mcwilliams MD at [address] [place] [address] ENDOSCOPY   • ESOPHAGOGASTRODUODENOSCOPY (EGD) N/A 10/27/2014    Performed by Pro Mcwilliams MD at [address] [place] [address] ENDOSCOPY MAXIMUM) Oral Tab Take 1 tablet by mouth daily. Disp:  Rfl:  3/18/2019 at Unknown time   Ezetimibe-Simvastatin (VYTORIN) 10-40 MG Oral Tab Take 1 tablet by mouth nightly.  Disp:  Rfl:  3/18/2019 at Unknown time       Current Facility-Administered Medication orientated x 3. Speech fluent. Comprehension intact. PERRLA +3 brisk,  EOMI. Face is symmetrical. CN's GI. Sensation to light touch is intact bilaterally. Strengths 5/5 bilaterally. Gait deferred. Rapid finger and foot movements intact.   CV:  Right acute on chronic SDH     Plan:  Discussed with Dr. Monica Mina on rounds  Carotid US for bruit  Neuro checks Q2 for now  Possible urbano hole evacuation of SDH later this week  Hold aspirin until cleared by neurosurgery      [facility], APN Rosario Mohs

## 2019-03-19 NOTE — PROGRESS NOTES
[facility]  Neurocritical Care APRN Progress Note    NAME: Elsy Serra - ROOM: A4/A4 - MRN: [id number] - Age: 80year old - :1932    History Of Present Illness:  Elsy Serra is a 80year old male with PMHx significant tia   • Subdural hematoma ([facility] 01/28/2019   • Unspecified disorder of thyroid    • Unspecified essential hypertension    • Visual impairment        Social Hx:  Social History    Tobacco Use      Smoking status: Former Smoker        Packs/day: 1.00 Neurologic: This patient is alert and orientated x 3. Speech fluent. Pupils equally round and reactive to light. 3+ brisk bilaterally. EOMs intact. Visual fields are full. Tongue is midline. Face is symmetrical. Uvula and palate elevate symmetrically. - Cardene gtt PRN to maintain -150  - Labetalol 10mg IV PRN   - 0.9NS @ 75/hr  - Lipid panel.   Restart/Continue Statin, goal LDL<70 and HDL>40  - Hold all anticoagulants and antiplatelets   - Seizure precautions  - PT, OT, and ST  - Neurosurgery con

## 2019-03-19 NOTE — PROCEDURES
ELECTROENCEPHALOGRAM REPORT      Patient Name: Jeronimo Solis Chart ID: [id number]  Ordering Physician:  Date of Test: 3/19/2019    A routine EEG was obtained. No sedation was given.     The dominant posterior rhythm consisted of 7-8 [place], [place]

## 2019-03-19 NOTE — CONSULTS
[facility]  Neurocritical Care       Name: Bertha Coombs.   MRN: [id number]  Admission Date/Time: 3/18/2019  9:49 PM  Primary Care Provider:  Rosario Rios MD        Reason for Consultation:   Acute on Chronic SDH    HPI:   Samson encounter         Date Noted: 01/28/2019      Acute subdural hematoma ([facility] [address]         Date Noted: 01/28/2019      Acute pancreatitis         Date Noted: 11/29/2018      Acute pancreatitis, unspecified complication status, unspecified pancreatitis type or graft    • Esophageal reflux    • GERD    • Heart attack ([facility] [address] 1994   • High blood pressure    • High cholesterol    • HYPERLIPIDEMIA    • HYPERTENSION    • HYPOTHYROIDISM    • Osteoarthritis    • OSTEOPENIA    • SINUSITIS    • Stroke [facility]) jan 2015 Vitamins-Minerals (TAB-A-MARCELLE MAXIMUM) Oral Tab Take 1 tablet by mouth daily. Disp:  Rfl:  3/18/2019 at Unknown time   Ezetimibe-Simvastatin (VYTORIN) 10-40 MG Oral Tab Take 1 tablet by mouth nightly.  Disp:  Rfl:  3/18/2019 at Unknown time       Hydrochlor (MIRALAX) powder packet 17 g 17 g Oral Daily PRN   magnesium hydroxide (MILK OF MAGNESIA) 400 MG/5ML suspension 30 mL 30 mL Oral Daily PRN   bisacodyl (DULCOLAX) rectal suppository 10 mg 10 mg Rectal Daily PRN   FLEET ENEMA (FLEET) 7-19 GM/118ML enema 133 # 3, 4, 6: Eyes move in all 6 cardinal directions normally and no Ptosis. # 5: Facial sensation to temp and light touch normal.   #7: No Facial asymmetry. # 8 Hearing normal.   # 9 & 10: Soft palate elevates normally.   # 11: Shoulder shrug is normal a to Dr. Asif Carbajal at 0201 hr.   Dictated by: Rosemary Ashby MD on 3/19/2019 at 6:28     Approved by: Rosemary Ashby MD            Xr Chest Ap Portable  (cpt=71045)    PROCEDURE:  XR CHEST AP PORTABLE  (CPT=71045)  TECHNIQUE:  AP chest radiograph was obtained. 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 Board Certified in Neurology, Vascular Neurology(Stroke), Neurocritical Care and Headache Medicine.

## 2019-03-19 NOTE — ED NOTES
Report given to [facility] RN at this time.

## 2019-03-19 NOTE — ED INITIAL ASSESSMENT (HPI)
In 2015 pt experienced a TIA on the left side. At 8p this evening, pt noticed the tips of the fingers were numb. Pt also reports some speech slurring. Symptoms lasted 15 minutes, now resolved. Denies pain or shortness of breath.

## 2019-03-19 NOTE — PHYSICAL THERAPY NOTE
PHYSICAL THERAPY EVALUATION - INPATIENT     Room Number: 5235/3836-A  Evaluation Date: 3/19/2019  Type of Evaluation: Initial  Physician Order: PT Eval and Treat    Presenting Problem: Acute on chronic intracranial subdural hematoma  Reason for [facility] basement)     Railing: Yes          Lives With: Spouse  Drives: Yes     Patient Regularly Uses: Glasses    Prior Level of Centre: Pt lives in a two story house with 3 stairs to the basement.  He is independent with all ADLs and IADLs and drives    SUB Scale): 50.25   CMS Modifier (G-Code): CJ    FUNCTIONAL ABILITY STATUS  Gait Assessment   Gait Assistance: Contact guard assist  Distance (ft): 150  Assistive Device: None  Pattern: Comment(unsteady gait with head turns)  Stoop/Curb Assistance: Contact gua re-educate;Stair training;Balance training  Rehab Potential : Good  Frequency (Obs): 5x/week  Number of Visits to Meet Established Goals: 5      CURRENT GOALS    Goal #1      Goal #2      Goal #3 Patient is able to ambulate 150 feet with assist device: can

## 2019-03-19 NOTE — SLP NOTE
ADULT SWALLOWING EVALUATION    ASSESSMENT    ASSESSMENT/OVERALL IMPRESSION:  Patient seen for swallowing evaluation per protocol. Patient alert and up in bed. He is admitted after brief episode of slurred speech and left finger numbness.   He is known to • High cholesterol    • HYPERLIPIDEMIA    • HYPERTENSION    • HYPOTHYROIDISM    • Osteoarthritis    • OSTEOPENIA    • SINUSITIS    • Stroke [facility]) jan 2015    tia   • [facility] 01/28/2019   • Unspecified disorder of thyroid    • Unspecified involvement              GOALS  Goal #1 Patient will participate in cognitive communication evaluation  In Progress     FOLLOW UP  Treatment Plan/Recommendations: Communication evaluation  Number of Visits to Meet Established Goals: 1  Follow Up Needed: Skyla Stanley

## 2019-03-19 NOTE — ED PROVIDER NOTES
Patient Seen in: [facility] Emergency Department    History   Patient presents with:  Numbness Weakness (neurologic)  Stroke (neurologic)    Stated Complaint: numbness to fingers and slurred speech resolved around 2000 tonight    HPI    She is 86-ye Packs/day: 1.00        Years: 40.00        Pack years: 40        Types: Cigarettes        Quit date: 6/15/1986        Years since quittin.7      Smokeless tobacco: Never Used    Alcohol use:  Yes      Alcohol/week: 0.0 oz      Frequency: Never      Dri Glucose 104 (*)     Sodium 134 (*)     BUN 19 (*)     Creatinine 1.39 (*)     GFR, Non- 46 (*)     GFR, -American 53 (*)     All other components within normal limits   PTT, ACTIVATED - Abnormal; Notable for the following components: subdural hematoma of mixed age. There may be a component of subarachnoid hemorrhage along the right frontal lobe. No significant midline shift. Basal cisterns are patent. Right tentorial subdural hemorrhage no longer visualized.     No acute stroke, hyd specified.       Medications Prescribed:  Current Discharge Medication List        Present on Admission  Date Reviewed: 2/27/2019          ICD-10-CM Noted POA    Acute on chronic intracranial subdural hematoma (HCC) I62.01, I62.03 3/19/2019 Unknown

## 2019-03-19 NOTE — ED NOTES
Pt ambulated to BR, is back resting abed at this time w/ no distress noted. VSS. Lights dimmed. Pt's wife provided recliner chair while waiting for MRI results. Pt has no further needs voiced at this time.

## 2019-03-19 NOTE — H&P
General Medicine H&P     Patient presents with:  Numbness Weakness (neurologic)  Stroke (neurologic)       PCP: Marian Meeks MD    History of Present Illness:     Mr. Dianna Romero is an 79 yo male with PMH of CAD, GERD, HTN, HLD, hypothyroidism, prior TI OTHER (SEE COMMENTS)    Comment:Unsure reaction       Senna 17.2 mg Nightly   docusate sodium 100 mg BID   famoTIDine 20 mg Daily   Or     famoTIDine 20 mg Daily   Levothyroxine Sodium 75 mcg Daily   lisinopril 40 mg Daily   Metoprolol Succinate ER 50 mg D 03/19/2019    PTP 14.0 03/19/2019    MG 2.2 03/19/2019    TROP <0.045 03/18/2019    PGLU 90 03/19/2019       Radiology: Ct Brain Or Head (35821)    Result Date: 3/19/2019  PROCEDURE:  CT BRAIN OR HEAD (63074)  COMPARISON:  [facility] , CT BRAIN OR HEAD (16651) (OVI=[id number]), 3/19/2019, 0:07. INDICATIONS:  R proximal stenosis per MRA  TECHNIQUE:  Real-time gray-scale and duplex ultrasound was used to evaluate the bilateral extracranial carotid and vertebral arteries.   B-mode 2-dimensional images of the Distal ICA Peak Systolic Velocity:  47.22 cm/s             Left ICA/CCA Velocity Ratio:  1.04  The vertebral arteries demonstrate antegrade flow. CONCLUSION:   1.  There is intimal irregularity along the proximal right cervical internal carotid artery images with FLAIR sequences and diffusion weighted images without and with infusion.   MR angiography of the brain without and with infusion and MR angiography of the neck without and with infusion was performed using 3D time of flight, multi-planar and 3D from motion seen on time-of-flight axial views. No occlusion or significant stenosis. No dissection. . CERVICAL VERTEBRAL:               Normal for age with no visible significant stenosis or dissection.       CONCLUSION:  There is an acute on chronic righ on the clinical documentation in H+P. Based on patients current state of illness, I anticipate that, after discharge, patient will require TBD.

## 2019-03-19 NOTE — PLAN OF CARE
Received RN to RN report from [address] @ 03:22    Received Michelle Sparrow from ED RN @approximately 6327: awake, alert, oriented, pleasant, and cooperative with care: [facility] neuro checks as charted; on room air; no reports of pain this shift      Please see flow-sheet

## 2019-03-19 NOTE — ED NOTES
Pt reports no resurgence of symptoms. MRI at this time states that they will be ready for patient around midnight, patient notified at this time.

## 2019-03-19 NOTE — PROGRESS NOTES
[facility] Note:  Renal Dose Adjustment for Metoclopramide (REGLAN)    Emily Marvin. has been prescribed Metoclopramide (REGLAN) 10 mg every 8 hours as needed for nausea/vomiting. Estimated Creatinine Clearance: 30.7 mL/min (A) (based on SCr of 1.

## 2019-03-19 NOTE — PLAN OF CARE
Report received from Bethany Ko. Received patient A/O x4, with belongings. Patient orientated to tele room. Answers questions appropriately and follows commands. Neuros monitored q4hrs (see flowsheet), no new deficits noted.  VSS, oxygen maintained on

## 2019-03-20 ENCOUNTER — APPOINTMENT (OUTPATIENT)
Dept: CT IMAGING | Facility: HOSPITAL | Age: 84
DRG: 027 | End: 2019-03-20
Attending: NURSE PRACTITIONER
Payer: MEDICARE

## 2019-03-20 PROCEDURE — 70498 CT ANGIOGRAPHY NECK: CPT | Performed by: NURSE PRACTITIONER

## 2019-03-20 NOTE — PROGRESS NOTES
03/20/19 7431   Clinical Encounter Type   Visited With Patient and family together   Continue Visiting No   Patient Spiritual Encounters   Spiritual Interventions  initiated relationship with pt., providing support as pt. shared current, and pas

## 2019-03-20 NOTE — PLAN OF CARE
Assumed patient care. Patient alert, answer questions appropriately, forgetful. Freq. POC discussion provided. Neuros monitored (see flowsheet). No c/o of headache or any additional pain. Seizure precautions maintained.  No seizure activity assessed during

## 2019-03-20 NOTE — SLP NOTE
SPEECH/LANGUAGE/COGNITIVE EVALUATION - INPATIENT    Admission Date: 3/18/2019  Evaluation Date: 03/20/19    Reason for Referral: Stroke protocol    ASSESSMENT & PLAN   ASSESSMENT & IMPRESSION  Pt seen for cognitive evaluation per stroke protocol.   Pt is be taken part in this evaluation and plan of treatment and have been advised and agree on the findings and goals.       FOLLOW UP  Treatment Plan/Recommendations: Cognitive communication therapy  Number of Visits to Meet Established Goals: 3  Follow Up Needed:

## 2019-03-20 NOTE — CONSULTS
[address]    PATIENT'S NAME: Aracelis Mcdonald   ATTENDING PHYSICIAN: Rm Currie. Ton Boyle M.D.   [facility] Michael: Anabelle Zee M.D.    PATIENT ACCOUNT#:   [de-identified]    LOCATION:  [address] [place] [address]  MEDICAL RECORD #:   [id number]       DATE hemispheric sharp waves and slow waves, signifying seizure activity. He was placed on Keppra prophylactically. He denies having any episodes of loss of consciousness or staring spells. Patient was stable overnight and transferred to the floor.   He was s II through VIII are intact. Moves all extremities equally. Finger-finger-nose is intact. Rapid alternating movements are intact. Gait is deferred. Sensation is intact x4. Speech is fluent. There is no dysarthria.       ASSESSMENT AND PLAN:  Patient

## 2019-03-20 NOTE — DIETARY NOTE
[facility]    NUTRITION INITIAL ASSESSMENT    Pt meets non-severe malnutrition criteria.     CRITERIA FOR MALNUTRITION DIAGNOSIS:  Criteria for non-severe malnutrition diagnosis: chronic illness related to wt loss 5% in 1 month and energy intake less t kg    WEIGHT HISTORY:   Wt Readings from Last 23 Encounters:  03/20/19 : 67.5 kg (148 lb 13 oz)  02/27/19 : 72.6 kg (160 lb)  02/04/19 : 72.4 kg (159 lb 9.6 oz)  01/28/19 : 72.6 kg (160 lb)  12/20/18 : 72.6 kg (160 lb)  12/03/18 : 73.9 kg (163 lb)  11/30/1

## 2019-03-20 NOTE — PLAN OF CARE
Assumed care at [address]. VSS, tele SR, on room air. Neuros q 4. A&Ox3, more forgetful at night. Denies pain when asked. IVF infusing per order. Sz precautions in place, PO keppra given. Patient upset this evening, states not being told about POC.   Spoke

## 2019-03-20 NOTE — PLAN OF CARE
Problem: Impaired Cognition  Goal: Patient will exhibit improved attention, thought processing and/or memory  Interventions:  - Minimize distractions in the room when full attention is required  Outcome: Progressing

## 2019-03-20 NOTE — PROGRESS NOTES
[facility]  Neurosurgery Progress Note    Randa Darling.  Patient Status:  Inpatient    1932 MRN [id number]   [facility] 7NE-A Attending Pamella Garcia MD   Hosp Day # 1 PCP Bang Brumfield MD     Chief Complaint:  Acu with Dr. Chuck Gonzales on rounds  CTA of neck ordered  May need cards or IR consultation for further management  Plan for urbano hole evacuation of hematoma once carotid work up complete      Annie Mendoza [facility]  3/20/2019, 8:17 AM

## 2019-03-20 NOTE — PROGRESS NOTES
[facility] Hospitalist Progress Note     PCP: Silviano Fleming MD    Chief Complaint: follow-up    Overnight/Interim Events:      SUBJECTIVE:  Pt laying in bed. Denies pain. Asking about plan, discussed. 145/56, 78.      OBJECTIVE:  Temp:  [98.2 °F (36.8 °C)- <0.045         Meds:     • Senna  17.2 mg Oral Nightly   • docusate sodium  100 mg Oral BID   • famoTIDine  20 mg Oral Daily    Or   • famoTIDine  20 mg Intravenous Daily   • Levothyroxine Sodium  75 mcg Oral Daily   • lisinopril  40 mg Oral Daily   • Meto

## 2019-03-21 NOTE — PROGRESS NOTES
[facility] Hospitalist Progress Note     PCP: Fiordaliza Amaro MD    Chief Complaint: follow-up    Overnight/Interim Events:      S:  Pt seen and examined. Denies HA or blurry vision. Anxious about surgery. No F/C.        OBJECTIVE:  Temp:  [97.4 °F (36.3 03/20/19   1341  03/20/19   1655  03/21/19   0013  03/21/19   0621  03/21/19   1228   PGLU  86  94  79  79  87       Recent Labs   Lab  03/18/19   2203   TROP  <0.045         Meds:     • Senna  17.2 mg Oral Nightly   • docusate sodium  100 mg Oral BID   •

## 2019-03-21 NOTE — SLP NOTE
SPEECH DAILY NOTE - INPATIENT    ASSESSMENT & PLAN   ASSESSMENT  Pt seen at bedside for cognitive communication tx as per POC. Spouse present. Pt did recall different SLP completed cog eval yesterday.  Pt currently mildly frustrated with overall stay in hos cues New     FOLLOW UP  Follow Up Needed: Yes  SLP Follow-up Date: 03/23/19  Number of Visits to Meet Established Goals: 3    Session: 1    If you have any questions, please contact Marina Barraza CCC-SLP/L, pager [phone]

## 2019-03-21 NOTE — PHYSICAL THERAPY NOTE
PHYSICAL THERAPY TREATMENT NOTE - INPATIENT    Room Number: 4355/0907-M     Session: 1   Number of Visits to Meet Established Goals: 5    Presenting Problem: Acute on chronic intracranial subdural hematoma    Problem List  Principal Problem:    Acute on c Static Sitting: Good  Dynamic Sitting: Good           Static Standing: Good  Dynamic Standing: Poor +    ACTIVITY TOLERANCE                         O2 WALK                    AM- Standardized Assessment  NA    Axillary Tranfers/Environmental Barriers    Toilet/Commode Transfers: NA  Stairs: NA  Curb Step: NA      THERAPEUTIC EXERCISES  Lower Extremity Ankle pumps     Upper Extremity  - open/close     Position Sitting     Re

## 2019-03-21 NOTE — OCCUPATIONAL THERAPY NOTE
OCCUPATIONAL THERAPY TREATMENT NOTE - INPATIENT     Room Number: 7921/3440-O  Session: 1   Number of Visits to Meet Established Goals: 1    Presenting Problem: transient LUE numbness and slurred speech, acute on chronic SDH    History related to current ad ?\"        OBJECTIVE  Precautions: (-150)    WEIGHT BEARING RESTRICTION                   PAIN ASSESSMENT  Ratin  Location: none reported        ACTIVITY TOLERANCE                         O2 SATURATIONS                ACTIVITIES OF DAILY LIVING and sequencing abilities. Results of the hand held dynamometer/ strength 62 pounds on Right and 55 lbs on Left. This patient is R hand dominant. The patient is below the norm for their age group.  Research has shown that Impaired  strength may h

## 2019-03-21 NOTE — PROGRESS NOTES
[facility]  Neurosurgery Progress Note    Guillermo Naranjo.  Patient Status:  Inpatient    1932 MRN [id number]   [facility] 7NE-A Attending Angela Maier, [facility] Day # 2 PCP Jazmine Estrella MD     Chief Complaint:  Acut

## 2019-03-21 NOTE — PLAN OF CARE
Assumed care at [address]. Patient A&Ox3-4 with forgetfulness. VSS, tele SR, on room air. Denies pain when asked. Up with SB assist.  Sz precautions in place. Plan for vascular consult tmw, nany urbano jimmy Friday. Will cont to monitor.     CARDIOVASCULAR -

## 2019-03-22 PROCEDURE — 00C43ZZ EXTIRPATION OF MATTER FROM INTRACRANIAL SUBDURAL SPACE, PERCUTANEOUS APPROACH: ICD-10-PCS | Performed by: NEUROLOGICAL SURGERY

## 2019-03-22 PROCEDURE — 009430Z DRAINAGE OF INTRACRANIAL SUBDURAL SPACE WITH DRAINAGE DEVICE, PERCUTANEOUS APPROACH: ICD-10-PCS | Performed by: NEUROLOGICAL SURGERY

## 2019-03-22 PROCEDURE — 0NU33KZ SUPPLEMENT RIGHT PARIETAL BONE WITH NONAUTOLOGOUS TISSUE SUBSTITUTE, PERCUTANEOUS APPROACH: ICD-10-PCS | Performed by: NEUROLOGICAL SURGERY

## 2019-03-22 DEVICE — OSTEOSPONGE CRANIAL DISC 14MM: Type: IMPLANTABLE DEVICE | Site: HEAD | Status: FUNCTIONAL

## 2019-03-22 RX ORDER — BUPIVACAINE HYDROCHLORIDE AND EPINEPHRINE 5; 5 MG/ML; UG/ML
INJECTION, SOLUTION EPIDURAL; INTRACAUDAL; PERINEURAL AS NEEDED
Status: DISCONTINUED | OUTPATIENT
Start: 2019-03-22 | End: 2019-03-22

## 2019-03-22 RX ORDER — CEFAZOLIN SODIUM/WATER 2 G/20 ML
SYRINGE (ML) INTRAVENOUS
Status: DISCONTINUED | OUTPATIENT
Start: 2019-03-22 | End: 2019-03-22

## 2019-03-22 RX ORDER — BACITRACIN 50000 [USP'U]/1
INJECTION, POWDER, LYOPHILIZED, FOR SOLUTION INTRAMUSCULAR AS NEEDED
Status: DISCONTINUED | OUTPATIENT
Start: 2019-03-22 | End: 2019-03-22

## 2019-03-22 NOTE — PROGRESS NOTES
Neurology fu note    Pt with no new c/o this am  Was seen by vascular surgery after cta, no dissection noted   Pt s/p CTA, artery stable, see notes  Pt scheduled for urbano hole for SDH this am  No new weakness, no seizures, no HA    Subacute on chronic SDH speech resolved around 2000 tonight     TECHNIQUE:  Noncontrast CT scanning is performed through the brain. Dose reduction techniques were used.  Dose information is transmitted to the ACR ([address] [place] [address] of Radiology) Marbin Moore 35 ([address] [place] [address] morphine, sulfa drugs.     SOCIAL HISTORY:  Former smoker, quit in [facility] 1.   Alcohol: A few alcoholic beverages a week.     FAMILY HISTORY:  Heart disease.     REVIEW OF SYSTEMS:  The rest of his review of systems is negative.       PHYSICAL EXAMINATION:    V

## 2019-03-22 NOTE — PLAN OF CARE
Assumed care at Doctor Miguel A 89 oriented and awake  Family at bedside  Aspiration and SZ prec maintained  Plan is for urbano hole evacuation of hematoma tomorrow  Will keep NPO after midnight  Discussed with patient and family   Call light in reach        CARDIO

## 2019-03-22 NOTE — CONSULTS
[facility]  Vascular Surgery Consultation    Darylene Carmel.  Patient Status:  Inpatient    1932 MRN [id number]   [facility] 7NE-A Attending Rickey Smith DO   Hosp Day # 3 PCP Stephany Tony MD     [facility] Relation Age of Onset   • Heart Disorder Father    • Other (Other) Brother       reports that he quit smoking about 32 years ago. His smoking use included cigarettes. He has a 40.00 pack-year smoking history.  he has never used smokeless tobacco. He reports BID    Review of Systems:    CONSTITUTIONAL: denies fever, chills  ENT: denies sore throat, nasal drainage/congestion  CHEST/CVS: denies cough, sputum, trouble breathing/SOB, chest pain, OWENS  GI: denies abdominal pain, heartburn, diarrhea, blood in bm, tar intimal was removed with the endarterectomy and there is only adventitia in this area. No evidence of stroke on CT scan. There is a subdural hematoma for which he has a bur hole scheduled for today.   No need for surgical intervention from vascular surger

## 2019-03-22 NOTE — OPERATIVE REPORT
[facility]    OPERATIVE REPORT    Patient:  Burman Blizzard.;  YOB: 1932     CSN:  [id number]; Medical Record Number:  [id number]    Admission Date:  3/18/2019 Operation Date:  3/22/2019    . ..........................     Operating Physi allowing the possibility for connecting, and extending laterally, the frontal and parietal incisions to allow for turning a craniotomy flap . The procedure was initiated with a 3 cm opening of the skin down to pericranium at the right parietal incision. approximately 10 cm forward of the bur hole. I brought the catheter out through a separate stab wound behind the parietal incision and connected under sterile conditions to a collection system.      The closure consisted of making a groove in the skull at

## 2019-03-22 NOTE — PLAN OF CARE
Assumed care @ 0700. Pt a/o x4, VSS. Neuro checks q4, no deficits. Tele SR. No acute respiratory distress noted. Denies any pain. Pt ambulated to bathroom with SBA.  (-) BM. Pt maintained NPO since last midnight.   AM PO meds given per APN's orde

## 2019-03-22 NOTE — PLAN OF CARE
Assumed care @ 0700. Pt a/o x4, VSS. Neuro checks q4, no deficits. Tele SR. No acute respiratory distress noted. Denies any pain. Pt ambulated to the bathroom with 1 assist.    Plan for [place] hole for hematoma evacuation sx tmr.   NPO [place].  [address] Imani Williamson RN e

## 2019-03-22 NOTE — PROGRESS NOTES
[facility] Hospitalist Progress Note     PCP: Cuate Garcia MD    Chief Complaint: follow-up    Overnight/Interim Events:       S:  Pt seen and examined. Denies HA or blurry vision. Anxious about surgery, wants to know when he is going. No F/C.   Isabell Amor Lab  03/18/19   2203  03/19/19   0423   ALT  20  18   AST  19  17   ALB  3.9  3.7       Recent Labs   Lab  03/21/19   0621  03/21/19   1228  03/21/19   1623  03/21/19   2049  03/22/19   0835   PGLU  79  87  148*  97  84       Recent Labs   Lab  03/18/19

## 2019-03-23 ENCOUNTER — APPOINTMENT (OUTPATIENT)
Dept: CT IMAGING | Facility: HOSPITAL | Age: 84
DRG: 027 | End: 2019-03-23
Attending: NEUROLOGICAL SURGERY
Payer: MEDICARE

## 2019-03-23 PROCEDURE — 99291 CRITICAL CARE FIRST HOUR: CPT | Performed by: INTERNAL MEDICINE

## 2019-03-23 PROCEDURE — 70450 CT HEAD/BRAIN W/O DYE: CPT | Performed by: NEUROLOGICAL SURGERY

## 2019-03-23 NOTE — PLAN OF CARE
Patient is alert and oriented x4, moves all extremities, strength is 5/5. Right subdural drain intact with small amount of bloody drainage. He complained of tenderness on the site of drain but tolerable. Head CT scheduled at 5AM. Left radial a-line intact.

## 2019-03-23 NOTE — PROGRESS NOTES
[facility] Hospitalist Progress Note     PCP: Ignacio Galvez MD    Chief Complaint: follow-up    Overnight/Interim Events:       S:  Pt sitting in chair. No cp/sob. No HA/marked pain. Pain if lays down on site. Had BM. Walked. 115/61.  On 2L    OBJECTIVE: 16  16  9  11  14  16   CREATSERUM  1.10  1.10  1.05  1.09  0.92  1.03   CA  9.4  9.4  9.3  9.2  8.8  8.7   MG  2.2   --    --    --   1.9   GLU  92  92  78  76  81  137*       Recent Labs   Lab  03/18/19   2203  03/19/19   0423   ALT  20  18   AST  19  1 GERD  - Continue home PPI     Dispo: CTU    Questions/concerns were discussed with patient and wife at bedside. Reviewed chart including previous progress notes.  D/w RN Jessi Yu MD  [facility] Hospitalist  [phone]  3/23/2019  5:16 PM

## 2019-03-23 NOTE — PROGRESS NOTES
[facility]  GINGER Progress Note    Guillermo Naranjo.  Patient Status:  Inpatient    1932 MRN [id number]   [facility] 6NE-A Attending Roxie Interiano MD   [facility] Day # 4 PCP Jazmine Estrella MD       Subjective: Mild incisional

## 2019-03-23 NOTE — PROGRESS NOTES
Patient returned to Neuro ICU s/p right parietal urbano hole for subdural hematoma and placement of subdural drain.   Extubated post-op and currently hemodynamically stable with intact neuro exam.    Patient was admitted on 3/18 with complaints of dysarthria Finger-to-nose coordination is intact. Gait deferred.     A/P:  Right frontal parietal acute on chronic SDH--s/p right parietal urbano hole for SDH and placement of subdural drain.    -Neuro q1h  --150  -Continue BP medications as ordered  -Labetalol

## 2019-03-23 NOTE — PROGRESS NOTES
[facility]  Neurocritical Care Progress Note     Randa Darling.  Patient Status:  Inpatient    1932 MRN [id number]   [facility] 6NE-A Attending Pamella Garcia MD   [facility] Day # 4 PCP Bang Brumfield MD injection 1 mg 1 mg Intravenous Q15 Min PRN   Levothyroxine Sodium (SYNTHROID, LEVOTHROID) tab 75 mcg 75 mcg Oral Daily   lisinopril (PRINIVIL,ZESTRIL) tab 40 mg 40 mg Oral Daily   Metoprolol Succinate ER (Toprol XL) 24 hr tab 50 mg 50 mg Oral Daily   Lev cont zestril and metop  Pulmonary:  Stable   Renal:  IVFs  GI:  PO as tolerated  Heme/ID:  Afebrile, no leukocytosis  Endocrine:  Monitor glucose, sliding scale insulin prn.  Cont synthroid  DVT Prophylaxis:  SCD’s    Goals of the Day: neurochecks  A total

## 2019-03-23 NOTE — PLAN OF CARE
rcvd patient from PACU, AOx3, DRAPER. IVF infusing, subdural drain on right draining serosanguinous drainage.

## 2019-03-24 PROCEDURE — 99291 CRITICAL CARE FIRST HOUR: CPT | Performed by: INTERNAL MEDICINE

## 2019-03-24 RX ORDER — OFLOXACIN 3 MG/ML
1 SOLUTION/ DROPS OPHTHALMIC 4 TIMES DAILY
Status: DISCONTINUED | OUTPATIENT
Start: 2019-03-24 | End: 2019-03-25

## 2019-03-24 RX ORDER — POLYVINYL ALCOHOL 14 MG/ML
1 SOLUTION/ DROPS OPHTHALMIC 4 TIMES DAILY PRN
Status: DISCONTINUED | OUTPATIENT
Start: 2019-03-24 | End: 2019-03-25

## 2019-03-24 RX ORDER — HYDRALAZINE HYDROCHLORIDE 20 MG/ML
10 INJECTION INTRAMUSCULAR; INTRAVENOUS EVERY 4 HOURS PRN
Status: DISCONTINUED | OUTPATIENT
Start: 2019-03-24 | End: 2019-03-25

## 2019-03-24 NOTE — PHYSICAL THERAPY NOTE
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: One level(with basement)     Railing: Yes          Lives With: Spo commode, etc.): None   -   Moving from lying on back to sitting on the side of the bed?: A Little   How much help from another person does the patient currently need. ..   -   Moving to and from a bed to a chair (including a wheelchair)?: 22 [address] Soler for presenting problems above. Pertinent comorbidities and personal factors impacting therapy include 5 items as documented above. In this PT evaluation, the patient presents with the following impairments: dec in balance.       Functional outcome me

## 2019-03-24 NOTE — PROGRESS NOTES
[facility]  Neurocritical Care Progress Note     Rocael Kaminski.  Patient Status:  Inpatient    1932 MRN [id number]   [facility] 6NE-A Attending Monroe Chiang MD   [facility] Day # 5 PCP Debbie Bryant MD Oral Daily   Metoprolol Succinate ER (Toprol XL) 24 hr tab 50 mg 50 mg Oral Daily   levETIRAcetam (KEPPRA) tab 500 mg 500 mg Oral BID     Physical Examination:   General- No acute distress  CV- RRR  Resp- CTA Bilat  Neuro-  · Mental status- awake and alert

## 2019-03-24 NOTE — PROGRESS NOTES
[facility]  GINGER Progress Note    Leafy Dibbles. Patient Status:  Inpatient    1932 MRN [id number]   [facility] 6NE-A Attending Maria Ines Gonzalez MD   [address] Day # 5 PCP Janeen Garcia MD       Subjective:  Intermittent inc

## 2019-03-24 NOTE — PLAN OF CARE
Patient is alert and oriented x4. Denies any numbness, no tingling sensations. Face is symmetrical. All four extremities strong and equal. Loose stools tonight, held colace and will continue to monitor bowel movements.  Subdural drain is intact with very sc

## 2019-03-24 NOTE — PROGRESS NOTES
[facility] Hospitalist Progress Note     PCP: Janeen Garcia MD    Chief Complaint: follow-up    Overnight/Interim Events:       S:  Pt sitting in chair. Upset about glc check. Eye itches.  Head ok    OBJECTIVE:  Temp:  [97.7 °F (36.5 °C)-98.9 °F (37.2 °C)] 104  107  107  107   CO2  28.0  28.0  25.0  28.0  27.0  23.0  29.0   BUN  16  16  9  11  14  16  13   CREATSERUM  1.10  1.10  1.05  1.09  0.92  1.03  0.90   CA  9.4  9.4  9.3  9.2  8.8  8.7  8.9   MG  2.2   --    --    --   1.9   --    GLU  92  92  78  76 on hold  - HOLD ASA   - F/u with PCP     # Hypothyroidism  - Continue home levothyroxine     # GERD  - Continue home PPI     # Eye itching/possible abrasion  -natural tears, ofloxacin eye gtt    Dispo: CN ICU     Questions/concerns were discussed with ana

## 2019-03-24 NOTE — PLAN OF CARE
07:35, subdural drain removed by Dr Samantha Forrest, one suture placed, no drainage at site, patient tolerated well. Plan to transfer to floor after lunch if no complications.

## 2019-03-25 VITALS
HEART RATE: 80 BPM | DIASTOLIC BLOOD PRESSURE: 57 MMHG | OXYGEN SATURATION: 96 % | BODY MASS INDEX: 26.64 KG/M2 | TEMPERATURE: 98 F | RESPIRATION RATE: 22 BRPM | HEIGHT: 63 IN | WEIGHT: 150.38 LBS | SYSTOLIC BLOOD PRESSURE: 137 MMHG

## 2019-03-25 RX ORDER — LEVETIRACETAM 500 MG/1
500 TABLET ORAL DAILY
Qty: 7 TABLET | Refills: 0 | Status: SHIPPED | OUTPATIENT
Start: 2019-03-25 | End: 2019-05-14

## 2019-03-25 RX ORDER — LEVETIRACETAM 500 MG/1
500 TABLET ORAL DAILY
Qty: 6 TABLET | Refills: 0 | Status: SHIPPED | OUTPATIENT
Start: 2019-03-25 | End: 2019-03-25

## 2019-03-25 RX ORDER — LEVETIRACETAM 500 MG/1
500 TABLET ORAL DAILY
Qty: 7 TABLET | Refills: 0 | Status: SHIPPED | OUTPATIENT
Start: 2019-03-25 | End: 2019-03-25

## 2019-03-25 NOTE — DISCHARGE SUMMARY
[facility] Internal Medicine Discharge Summary   Patient ID:  Toshia Forrest  [id number]  80year old  7/22/1932    Admit date: 3/18/2019    Discharge date and time: 3/25/2019     Attending Physician: Charisma Robles MD     Primary Care Physician: Mira Price signifying seizure activity.    -started Keppra 500mg PO BID, wean per neuro  # CTA findings  -appreciate vascular recs, no surgical intervention recommended     # Hx CAD / HTN  - Continue metoprolol  - Continue lisinopril  - holding amlodipine  - zetia/sta Medications      You can get these medications from any pharmacy    Bring a paper prescription for each of these medications  · levETIRAcetam 500 MG Tabs         Consults: IP CONSULT TO NEUROLOGY  IP CONSULT TO NEUROSURGERY  IP CONSULT TO HOSPITALIST  IP C craniocervical junction is unremarkable. SINUSES:           No sign of acute sinusitis. MASTOIDS:          No sign of acute inflammation. SKULL:             No evidence for fracture or osseous abnormality. OTHER:             None.       CONCLUSION:  Ellie Neves sign of acute sinusitis. MASTOIDS:          No sign of acute inflammation. SKULL:             No depressed calvarial fracture. .      CONCLUSION:  Acute on chronic right subdural hematoma measuring up to 11 mm in thickness.   Minimal mass effect on the righ evaluation as clinically directed. There is smooth mixed predominately calcified plaque in the mid left common carotid artery, left carotid bulb, and proximal left internal carotid artery by visual inspection. HEART RATE:      Regular.   VELOCITY MEASUREM fingers and slurred speech resolved around 2000 tonight  PATIENT STATED HISTORY: (As transcribed by Technologist)  Patient presents to Ed with numbness to fingers and slurred speech. Resolved around 2000.     FINDINGS: Cardiac silhouette and pulmonary vascu Minimal mucosal thickening ethmoid air cells and right maxillary sinus MASTOIDS:       The mastoids are clear. MRA BRAIN INTRACRANIAL CAROTIDS:         No significant stenosis or aneurysm. ANTERIOR CEREBRALS:         No significant stenosis or aneurysm.  A flap  TECHNIQUE:  Contrast-enhanced multislice CT angiography of the neck vasculature from AP window to Sella was performed using nonionic contrast. 3D volume rendering images were obtained by the technologist as well as the radiologist on an independent w and may be surgically absent. On image 27, there is a 5 mm nodule within the right lung apex. This is unchanged since 1/13/2015 and therefore benign. Biapical pleural parenchymal scarring  is noted. Mild mucosal thickening within the maxillary sinuses.

## 2019-03-25 NOTE — OCCUPATIONAL THERAPY NOTE
OCCUPATIONAL THERAPY QUICK EVALUATION - INPATIENT    Room Number: 0871/4988-R  Evaluation Date: 3/25/2019     Type of Evaluation: Re-evaluation  Presenting Problem: acute on chronic SDH, 3/22 s/p craniotomy urbano hole for drainage    Physician Order: ASHLEY Con implants   • COLONOSCOPY  11/04   • COLONOSCOPY N/A 10/27/2014    Performed by Carl Li MD at [address] [place], [place] [address]e Right 3/22/2019    Performed by Ashleigh Copeland MD at [facility] MAIN OR   • ESOPHAGOGASTRODUODENOSCOPY (EGD) N/A 10/27/2014 Toileting, which includes using toilet, bedpan or urinal? : None  -   Putting on and taking off regular upper body clothing?: None  -   Taking care of personal grooming such as brushing teeth?: None  -   Eating meals?: None    AM-PAC Score:  Score: 24  Nicanor profile, problem-focused assessments, limited treatment options    Overall Complexity  LOW     OT Discharge Recommendations: Home       PLAN   Patient has been evaluated and presents with no skilled Occupational Therapy needs at this time.   Patient dischar

## 2019-03-25 NOTE — PLAN OF CARE
[facility]c at 2250. Patient A&Ox4. VSS, tele SR, on room air. C/o incisional pain to right side of head, tylenol given prior to transfer. Cont to c/o itchiness to left eye, warm compress given. Up with SB and cane.   No s/s

## 2019-03-25 NOTE — PLAN OF CARE
NURSING DISCHARGE NOTE    Discharged Home via Wheelchair. Accompanied by Support staff  Belongings Taken by patient/family. Cleared for discharge by all consults. Reviewed discharge AVS with patient and wife.  Went over medications, including purpos

## 2019-03-25 NOTE — SLP NOTE
Attempted to see pt for speech therapy services. Pt working with other staff at this time. Will follow up as scheduling permits. Thank you.     Miley Kerr M.S. [address] [facility]  Pager [phone]

## 2019-03-25 NOTE — DIETARY NOTE
[facility]    NUTRITION FOLLOW UP ASSESSMENT    Pt meets non-severe malnutrition criteria.     CRITERIA FOR MALNUTRITION DIAGNOSIS:  Criteria for non-severe malnutrition diagnosis: chronic illness related to wt loss 5% in 1 month and energy intake less with an emphasis on protein intake, pt was receptive. Discussed rationale of supplements, pt agreed to try Chocolate Ensure Enlive BID. No c/s/n/v/d reported. All questions answered. ANTHROPOMETRICS:  Ht: 160 cm (5' 3\")  Wt: 68.2 kg (150 lb 5.7 oz).  Mercy Mix mass    MEDICATIONS:  Noted    LABS:  Noted    Pt is at moderate nutrition risk    FOLLOW-UP DATE: 3/29/19      Jorge L Skinner MA, RD, LDN  Pager [phone]

## 2019-03-25 NOTE — PLAN OF CARE
Patient is alert and oriented x4, ambulated in the hallway today. PT recommended use of cane or walker for now. Patient's blood pressure elevated this evening. Hydralazine IV ordered prn.  Discussed with patient the importance of keeping his blood pressure

## 2019-03-26 NOTE — CM/SW NOTE
03/26/19 1100   Discharge disposition   Expected discharge disposition Home or Self   Name of [address] Provider Home   Outpatient services Physical therapy; Occupational therapy   Discharge transport

## 2019-04-02 ENCOUNTER — TELEPHONE (OUTPATIENT)
Dept: SURGERY | Facility: CLINIC | Age: 84
End: 2019-04-02

## 2019-04-08 ENCOUNTER — OFFICE VISIT (OUTPATIENT)
Dept: SURGERY | Facility: CLINIC | Age: 84
End: 2019-04-08
Payer: MEDICARE

## 2019-04-08 VITALS — DIASTOLIC BLOOD PRESSURE: 66 MMHG | HEART RATE: 84 BPM | SYSTOLIC BLOOD PRESSURE: 130 MMHG

## 2019-04-08 DIAGNOSIS — S06.5X9A SDH (SUBDURAL HEMATOMA) (HCC): Primary | ICD-10-CM

## 2019-04-08 PROCEDURE — 99024 POSTOP FOLLOW-UP VISIT: CPT | Performed by: PHYSICIAN ASSISTANT

## 2019-04-08 NOTE — PATIENT INSTRUCTIONS
Refill policies:    • Allow 2-3 business days for refills; controlled substances may take longer.   • Contact your pharmacy at least 5 days prior to running out of medication and have them send an electronic request or submit request through the “request re been approved by your insurer. Depending on your insurance carrier, approval may take 3-10 days. It is highly recommended patients contact their insurance carrier directly to determine coverage.   If test is done without insurance authorization, patient ma 2 weeks  3. Imaging: CT of the head in 4-6 weeks  4. Activity: As tolerated, he can continue with physical therapy for gait training. He may resume a limited driving as tolerated.

## 2019-04-08 NOTE — PROGRESS NOTES
NEUROSURGERY  POSTOP CRANIAL FOLLOW UP    REASON FOR VISIT:   1. Status post right frontoparietal craniotomy for subdural evacuation 3/22/19 Dr. Cross Frame  2.   Right subdural hematoma along the right tentorium January 20, 2019 nonoperative    HISTORY OF PRESEN visit.     REVIEW OF SYSTEMS:  Denies fever, chills, shortness of breath, chest pain. PHYSICAL EXAMINATION:  GENERAL:  Patient is in no acute distress. HEENT:  Normocephalic, atraumatic  NEUROLOGICAL:  This patient is alert and orientated x 3.   Speech

## 2019-04-22 ENCOUNTER — TELEPHONE (OUTPATIENT)
Dept: SURGERY | Facility: CLINIC | Age: 84
End: 2019-04-22

## 2019-05-14 ENCOUNTER — OFFICE VISIT (OUTPATIENT)
Dept: SURGERY | Facility: CLINIC | Age: 84
End: 2019-05-14
Payer: MEDICARE

## 2019-05-14 VITALS — HEART RATE: 68 BPM | DIASTOLIC BLOOD PRESSURE: 80 MMHG | SYSTOLIC BLOOD PRESSURE: 120 MMHG

## 2019-05-14 DIAGNOSIS — S06.5X9A SDH (SUBDURAL HEMATOMA) (HCC): Primary | ICD-10-CM

## 2019-05-14 PROCEDURE — 99024 POSTOP FOLLOW-UP VISIT: CPT | Performed by: PHYSICIAN ASSISTANT

## 2019-05-14 NOTE — PROGRESS NOTES
Pt is here for post op  ELIESER HOLE EVACUATION OF HEMATOMA ON RIGHT POSSIBLE CRANIOTOMY 3/22/19     Pt states that he has been doing good since LOV/     CT of the brain obtained.

## 2019-05-14 NOTE — PROGRESS NOTES
NEUROSURGERY  POSTOP CRANIAL FOLLOW UP    REASON FOR VISIT:   1. Status post right frontoparietal craniotomy for subdural evacuation 3/22/19 Dr. Rosetta Cranker  2.   Right subdural hematoma along the right tentorium January 20, 2019 nonoperative    HISTORY OF PRESEN 2 mm by report significantly improved    CT of the head 4/3/19 significant interval improvement of the right subdural hematoma. ASSESSMENT:  1. Status post right frontoparietal craniotomy for subdural evacuation 3/22/19 Dr. Monica Mina  2.   Right subdural hem

## 2019-05-24 PROBLEM — K85.90 ACUTE PANCREATITIS, UNSPECIFIED COMPLICATION STATUS, UNSPECIFIED PANCREATITIS TYPE (HCC): Status: RESOLVED | Noted: 2018-11-29 | Resolved: 2019-05-24
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
## 2019-05-24 PROBLEM — K85.90 ACUTE PANCREATITIS, UNSPECIFIED COMPLICATION STATUS, UNSPECIFIED PANCREATITIS TYPE: Status: RESOLVED | Noted: 2018-11-29 | Resolved: 2019-05-24

## 2019-05-24 PROBLEM — K85.90 ACUTE PANCREATITIS: Status: RESOLVED | Noted: 2018-11-29 | Resolved: 2019-05-24
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
## 2019-05-24 PROBLEM — K85.90 ACUTE PANCREATITIS (HCC): Status: RESOLVED | Noted: 2018-11-29 | Resolved: 2019-05-24

## 2019-10-24 ENCOUNTER — OFFICE VISIT (OUTPATIENT)
Dept: SURGERY | Facility: CLINIC | Age: 84
End: 2019-10-24
Payer: MEDICARE

## 2019-10-24 VITALS — OXYGEN SATURATION: 96 % | DIASTOLIC BLOOD PRESSURE: 62 MMHG | SYSTOLIC BLOOD PRESSURE: 140 MMHG | HEART RATE: 64 BPM

## 2019-10-24 DIAGNOSIS — R26.9 NEUROLOGIC GAIT DYSFUNCTION: ICD-10-CM
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
## 2019-10-24 DIAGNOSIS — S06.5X9A SDH (SUBDURAL HEMATOMA) (HCC): Primary | ICD-10-CM

## 2019-10-24 PROCEDURE — 99213 OFFICE O/P EST LOW 20 MIN: CPT | Performed by: PHYSICIAN ASSISTANT

## 2019-10-24 NOTE — PROGRESS NOTES
NEUROSURGERY  POSTOP CRANIAL FOLLOW UP    REASON FOR VISIT:   1. Status post right frontoparietal craniotomy for subdural evacuation 3/22/19 Dr. Abel Mcgrath  2.   Right subdural hematoma along the right tentorium January 20, 2019 nonoperative    HISTORY OF PRESEN intact. Pupils equally round and reactive to light. 3+ brisk bilaterally. EOMs intact. Face is symmetrical. Tongue is midline. Uvula and palate elevate symmetrically. Shrug shoulders normally bilaterally. Cranial nerves II-XII are grossly intact.   Ne

## 2019-10-24 NOTE — PATIENT INSTRUCTIONS
Refill policies:    • Allow 2-3 business days for refills; controlled substances may take longer.   • Contact your pharmacy at least 5 days prior to running out of medication and have them send an electronic request or submit request through the “request re Depending on your insurance carrier, approval may take 3-10 days. It is highly recommended patients contact their insurance carrier directly to determine coverage.   If test is done without insurance authorization, patient may be responsible for the entire pharmacy at least 5 days prior to running out of medication and have them send an electronic request or submit request through the “request refill” option in your [email or web address] account.   • Refills are not addressed on weekends; covering physicians do not authori insurance carrier directly to determine coverage. If test is done without insurance authorization, patient may be responsible for the entire amount billed. Precertification and Prior Authorizations:   If your physician has recommended that you have a

## 2019-10-24 NOTE — PROGRESS NOTES
Pt has trouble with balance, no dizziness, and c/o  headaches on and off. Pt states he also feel like the sx on his head  The incision has a bump.

## 2019-11-08 ENCOUNTER — OFFICE VISIT (OUTPATIENT)
Dept: SURGERY | Facility: CLINIC | Age: 84
End: 2019-11-08
Payer: MEDICARE

## 2019-11-08 VITALS — DIASTOLIC BLOOD PRESSURE: 80 MMHG | SYSTOLIC BLOOD PRESSURE: 115 MMHG | HEART RATE: 60 BPM

## 2019-11-08 DIAGNOSIS — S06.5X9A SDH (SUBDURAL HEMATOMA) (HCC): Primary | ICD-10-CM

## 2019-11-08 PROCEDURE — 99213 OFFICE O/P EST LOW 20 MIN: CPT | Performed by: PHYSICIAN ASSISTANT

## 2019-11-08 NOTE — PATIENT INSTRUCTIONS
Refill policies:    • Allow 2-3 business days for refills; controlled substances may take longer.   • Contact your pharmacy at least 5 days prior to running out of medication and have them send an electronic request or submit request through the “request re Depending on your insurance carrier, approval may take 3-10 days. It is highly recommended patients contact their insurance carrier directly to determine coverage.   If test is done without insurance authorization, patient may be responsible for the entire dizziness and vertigo  4. Call with any changes.

## 2019-11-08 NOTE — PROGRESS NOTES
NEUROSURGERY  POSTOP CRANIAL FOLLOW UP    REASON FOR VISIT:   1. Status post right frontoparietal craniotomy for subdural evacuation 3/22/19 Dr. Obdulio Luque  2.   Right subdural hematoma along the right tentorium January 20, 2019 nonoperative    HISTORY OF PRESEN He is here with his wife today. He is off his aspirin. He denies any seizures. He has weaned off the [address] that was given for prophylaxis. REVIEW OF SYSTEMS:  Negative. PHYSICAL EXAMINATION:  GENERAL:  Patient is in no acute distress.   HEENT:  No

## 2019-11-08 NOTE — PROGRESS NOTES
Pt is here for follow up for Subdural Hematoma. CT of the brain: Obtained     Pt states that he has been doing ok since LOV. No new symptome's to be reported.

## 2020-05-27 PROBLEM — Z12.5 PROSTATE CANCER SCREENING: Status: RESOLVED | Noted: 2017-05-17 | Resolved: 2020-05-27

## 2020-05-27 PROBLEM — S06.5X9A ACUTE SUBDURAL HEMATOMA (HCC): Status: RESOLVED | Noted: 2019-01-28 | Resolved: 2020-05-27

## 2020-05-27 PROBLEM — S06.5XAA ACUTE SUBDURAL HEMATOMA (HCC): Status: RESOLVED | Noted: 2019-01-28 | Resolved: 2020-05-27

## 2020-05-27 PROBLEM — S06.5XAA ACUTE SUBDURAL HEMATOMA: Status: RESOLVED | Noted: 2019-01-28 | Resolved: 2020-05-27

## 2020-08-17 ENCOUNTER — TELEPHONE (OUTPATIENT)
Dept: SURGERY | Facility: CLINIC | Age: 85
End: 2020-08-17

## 2020-08-17 DIAGNOSIS — R42 DIZZINESS: Primary | ICD-10-CM

## 2020-08-17 DIAGNOSIS — Z98.890 H/O CRANIOTOMY: ICD-10-CM

## 2020-08-17 DIAGNOSIS — R26.81 UNSTEADINESS: ICD-10-CM

## 2020-08-17 NOTE — TELEPHONE ENCOUNTER
pt still having some issue sometimes when he moves too fast; turning suddenly he feels off balance. He has not fallen. Pt would like to speak to Mack rose.  Can leave message

## 2020-08-18 NOTE — TELEPHONE ENCOUNTER
Returned patients call, explained Jonathan Gutierrez is with patients. Patient states he feels ok, however, occasionally, patient states he feels of balance when changing positions too quickly.   Patient would like to know if perhaps Jonathan Gutierrez would like to order a CT

## 2020-08-22 ENCOUNTER — HOSPITAL ENCOUNTER (OUTPATIENT)
Dept: CT IMAGING | Facility: HOSPITAL | Age: 85
Discharge: HOME | End: 2020-08-22
Attending: PHYSICIAN ASSISTANT
Payer: MEDICARE

## 2020-08-22 DIAGNOSIS — R26.81 UNSTEADINESS: ICD-10-CM

## 2020-08-22 DIAGNOSIS — Z98.890 H/O CRANIOTOMY: ICD-10-CM

## 2020-08-22 DIAGNOSIS — R42 DIZZINESS: ICD-10-CM

## 2020-08-22 PROCEDURE — 70450 CT HEAD/BRAIN W/O DYE: CPT | Performed by: PHYSICIAN ASSISTANT

## 2020-09-02 ENCOUNTER — TELEPHONE (OUTPATIENT)
Dept: SURGERY | Facility: CLINIC | Age: 85
End: 2020-09-02

## 2020-09-02 NOTE — TELEPHONE ENCOUNTER
At [address] [place] [address] On 11/8/20 with REINALDO Morrow:    \"ASSESSMENT:  1. Status post right frontoparietal craniotomy for subdural evacuation 3/22/19 Dr. Judit Daley  2. Right subdural hematoma along the right tentorium January 20, 2019 nonoperative  3.   Cervical spondy

## 2020-09-03 NOTE — TELEPHONE ENCOUNTER
Message from provider noted. Called patient to inform him of results. Patient stated that he understood the information and that he is not \"terribly concerned\" about his unsteadiness, as it typically occurs when he makes a sudden move.   Patient agreed t

## 2020-09-03 NOTE — TELEPHONE ENCOUNTER
CT of the head was negative for a bleed. For his imbalance and unsteadiness he can see neurology or he can make an appointment to see us for an evaluation and formulated treatment plan.

## 2020-09-15 ENCOUNTER — OFFICE VISIT (OUTPATIENT)
Dept: SURGERY | Facility: CLINIC | Age: 85
End: 2020-09-15
Payer: MEDICARE

## 2020-09-15 VITALS — HEART RATE: 60 BPM | SYSTOLIC BLOOD PRESSURE: 124 MMHG | DIASTOLIC BLOOD PRESSURE: 70 MMHG

## 2020-09-15 DIAGNOSIS — Z98.890 H/O CRANIOTOMY: ICD-10-CM

## 2020-09-15 DIAGNOSIS — R26.81 UNSTEADINESS: Primary | ICD-10-CM

## 2020-09-15 PROCEDURE — 99213 OFFICE O/P EST LOW 20 MIN: CPT | Performed by: PHYSICIAN ASSISTANT

## 2020-09-15 NOTE — PROGRESS NOTES
NEUROSURGERY  POSTOP CRANIAL FOLLOW UP      HISTORY OF PRESENT ILLNESS:Ernie Ivettselvin Santoro. is a 80year old male here in follow-up.   States a few weeks ago while in his garage they lowered the right slightly so the son would not climb them while they were si 2019  Overall he is doing very well. He is having very rare numbness in his left fingertips. Strength is doing very well. He denies any dizziness currently he does get unsteadiness if he turns too quickly when he rotates his feet to turn.   Otherwise he frontoparietal craniotomy for subdural evacuation 3/22/19 Dr. Abel Mcgrath  2. Right subdural hematoma along the right tentorium January 20, 2019 nonoperative  3. Cervical spondylosis  4. Gait instability    PLAN:  1. Follow up as needed      BUBBA Nava

## 2020-09-15 NOTE — PROGRESS NOTES
Patient is here to follow up after brain CT completed 8-22. Complains of occasionally being unsteady especially when turning. Denies any new symptoms.

## 2020-10-07 ENCOUNTER — HOSPITAL ENCOUNTER (OUTPATIENT)
Age: 85
Discharge: HOME | End: 2020-10-07
Payer: MEDICARE

## 2020-10-07 VITALS
WEIGHT: 162 LBS | BODY MASS INDEX: 29.81 KG/M2 | RESPIRATION RATE: 18 BRPM | HEART RATE: 74 BPM | SYSTOLIC BLOOD PRESSURE: 154 MMHG | OXYGEN SATURATION: 97 % | TEMPERATURE: 98 F | HEIGHT: 62 IN | DIASTOLIC BLOOD PRESSURE: 83 MMHG

## 2020-10-07 DIAGNOSIS — S61.431A PUNCTURE WOUND OF RIGHT HAND WITHOUT FOREIGN BODY, INITIAL ENCOUNTER: Primary | ICD-10-CM

## 2020-10-07 PROCEDURE — 99214 OFFICE O/P EST MOD 30 MIN: CPT | Performed by: PHYSICIAN ASSISTANT

## 2020-10-07 PROCEDURE — 99213 OFFICE O/P EST LOW 20 MIN: CPT | Performed by: PHYSICIAN ASSISTANT

## 2020-10-07 PROCEDURE — 90471 IMMUNIZATION ADMIN: CPT | Performed by: PHYSICIAN ASSISTANT

## 2020-10-07 RX ORDER — CEPHALEXIN 500 MG/1
500 CAPSULE ORAL 4 TIMES DAILY
Qty: 40 CAPSULE | Refills: 0 | Status: SHIPPED | OUTPATIENT
Start: 2020-10-07 | End: 2020-10-17

## 2020-10-07 NOTE — ED PROVIDER NOTES
Patient Seen in: [facility]      History   Patient presents with:  Laceration    Stated Complaint: right hand laceration    HPI    70-year-old male here with complaint of a puncture wound to the dorsum of his right hand between pertinent to presenting problem. The patient's medication list, past medical history and social history elements  as listed in today's nurse's notes are reviewed and agree.    The patient's family history is reviewed and is noncontributory to the present Pulmonary:      Effort: Pulmonary effort is normal.      Breath sounds: Normal breath sounds. Skin:     General: Skin is warm. Capillary Refill: Capillary refill takes less than 2 seconds. Findings: Wound present.       Comments: R hand : appr medications    cephALEXin 500 MG Oral Cap  Take 1 capsule (500 mg total) by mouth 4 (four) times daily for 10 days.   Qty: 40 capsule Refills: 0

## 2021-05-28 PROBLEM — I62.03 ACUTE ON CHRONIC INTRACRANIAL SUBDURAL HEMATOMA (HCC): Status: RESOLVED | Noted: 2019-03-19 | Resolved: 2021-05-28

## 2021-05-28 PROBLEM — I62.01 ACUTE ON CHRONIC INTRACRANIAL SUBDURAL HEMATOMA (HCC): Status: RESOLVED | Noted: 2019-03-19 | Resolved: 2021-05-28

## 2021-05-28 PROBLEM — Z86.79 HISTORY OF SUBDURAL HEMATOMA: Status: ACTIVE | Noted: 2021-05-28

## 2021-07-19 ENCOUNTER — HOSPITAL ENCOUNTER (OUTPATIENT)
Age: 86
Discharge: HOME | End: 2021-07-19
Payer: MEDICARE

## 2021-07-19 VITALS
SYSTOLIC BLOOD PRESSURE: 134 MMHG | TEMPERATURE: 98 F | OXYGEN SATURATION: 97 % | HEART RATE: 69 BPM | DIASTOLIC BLOOD PRESSURE: 76 MMHG | RESPIRATION RATE: 16 BRPM

## 2021-07-19 DIAGNOSIS — S41.112A SKIN TEAR OF LEFT UPPER EXTREMITY: Primary | ICD-10-CM

## 2021-07-19 PROCEDURE — 99212 OFFICE O/P EST SF 10 MIN: CPT

## 2021-07-19 NOTE — ED INITIAL ASSESSMENT (HPI)
Pt tripped up the stairs and got a skin tear on his left upper arm.   Pt states he did not fall hit head or lose consciousness

## 2021-07-19 NOTE — ED PROVIDER NOTES
Patient Seen in: [facility]      History   Patient presents with:  Laceration/Abrasion    Stated Complaint: abrasion,left arm    HPI/Subjective:   HPI  Patient is an 26-year-old male past medical history of GERD, hypertension, hyperlipide Vaping Use: Never used    Alcohol use: Yes      Alcohol/week: 0.0 standard drinks      Comment: rare    Drug use: No             Review of Systems    Positive for stated complaint: abrasion,left arm  Other systems are as noted in HPI.   Constitutional and v Prescribed:  Current Discharge Medication List

## 2022-01-08 ENCOUNTER — APPOINTMENT (OUTPATIENT)
Dept: CT IMAGING | Facility: HOSPITAL | Age: 87
End: 2022-01-08
Attending: EMERGENCY MEDICINE
Payer: MEDICARE

## 2022-01-08 ENCOUNTER — HOSPITAL ENCOUNTER (EMERGENCY)
Facility: HOSPITAL | Age: 87
Discharge: HOME | End: 2022-01-08
Attending: EMERGENCY MEDICINE
Payer: MEDICARE

## 2022-01-08 VITALS
RESPIRATION RATE: 16 BRPM | HEART RATE: 80 BPM | HEIGHT: 61 IN | WEIGHT: 160 LBS | OXYGEN SATURATION: 96 % | BODY MASS INDEX: 30.21 KG/M2 | SYSTOLIC BLOOD PRESSURE: 169 MMHG | DIASTOLIC BLOOD PRESSURE: 62 MMHG | TEMPERATURE: 98 F

## 2022-01-08 DIAGNOSIS — S09.90XA INJURY OF HEAD, INITIAL ENCOUNTER: Primary | ICD-10-CM

## 2022-01-08 DIAGNOSIS — S01.01XA LACERATION OF SCALP, INITIAL ENCOUNTER: ICD-10-CM

## 2022-01-08 PROCEDURE — 12002 RPR S/N/AX/GEN/TRNK2.6-7.5CM: CPT

## 2022-01-08 PROCEDURE — 99284 EMERGENCY DEPT VISIT MOD MDM: CPT

## 2022-01-08 PROCEDURE — 70450 CT HEAD/BRAIN W/O DYE: CPT | Performed by: EMERGENCY MEDICINE

## 2022-01-09 NOTE — ED INITIAL ASSESSMENT (HPI)
PT PRESENTS TO ED AFTER HE STATES HE WENT TO CHECK THE MAIL AND FELL ON ICE, HIT HEAD, PT STATES HE DID NOT LOSE CONSCIOUSNESS, IS NOT ON BLOOD THINNERS, HX OF FALL AND HIT HEAD, PREVIOUS BLEED.

## 2022-01-09 NOTE — ED PROVIDER NOTES
Patient Seen in: [facility] Emergency Department      History   Patient presents with:  Fall    Stated Complaint: FELL, HIT HEAD, LACERATION, NOT ON BLOOD THINNERS, HX OF FALL AND BLEED    Subjective:   HPI    Patient is a 80-year-old male comes edda 6/15/1986        Years since quittin.5      Smokeless tobacco: Never Used    Vaping Use      Vaping Use: Never used    Alcohol use: Yes      Alcohol/week: 0.0 standard drinks      Comment: rare    Drug use:  No             Review of Systems    Positive edema.  Dorsalis pedis and posterior tibial pulses 2+. No long bone tenderness or deformities noted  SKIN EXAMINATION: Warm and dry. No rashes   NEUROLOGICAL:  alert and oriented X 3, motor 5/5 all groups, normal sensation, speech is intact.     ED Course ischemic disease is noted. No evidence of intracranial hemorrhage or extra-axial fluid collection.    Dictated by (CST): Antonino Liu MD on 1/08/2022 at 7:15 PM     Finalized by (CST): Antonino Liu MD on 1/08/2022 at 7:17 PM       RUTH ANN Davies

## 2022-01-17 ENCOUNTER — HOSPITAL ENCOUNTER (EMERGENCY)
Facility: HOSPITAL | Age: 87
Discharge: HOME | End: 2022-01-17
Attending: EMERGENCY MEDICINE
Payer: MEDICARE

## 2022-01-17 VITALS
RESPIRATION RATE: 16 BRPM | SYSTOLIC BLOOD PRESSURE: 146 MMHG | WEIGHT: 140 LBS | DIASTOLIC BLOOD PRESSURE: 87 MMHG | OXYGEN SATURATION: 100 % | TEMPERATURE: 98 F | HEIGHT: 62 IN | HEART RATE: 76 BPM | BODY MASS INDEX: 25.76 KG/M2

## 2022-01-17 DIAGNOSIS — Z48.02 ENCOUNTER FOR STAPLE REMOVAL: Primary | ICD-10-CM

## 2022-01-17 NOTE — ED PROVIDER NOTES
Patient Seen in: [facility] Emergency Department      History   No chief complaint on file.     Stated Complaint: staple removal    Subjective:   HPI    The patient is an 17-year-old male presenting to the emergency department for staple removal.  St Temp 98.4 °F (36.9 °C)   Resp 16   Ht 157.5 cm (5' 2\")   Wt 63.5 kg   SpO2 100%   BMI 25.61 kg/m²         Physical Exam    General: Comfortable and well appearing. Alert and oriented in no distress.   Neuro: Grossly normal and symmetric motor strength and

## 2022-03-03 PROBLEM — I73.9 PVD (PERIPHERAL VASCULAR DISEASE): Status: ACTIVE | Noted: 2022-03-03

## 2022-03-03 PROBLEM — I73.9 PVD (PERIPHERAL VASCULAR DISEASE) (HCC): Status: ACTIVE | Noted: 2022-03-03

## 2022-10-12 ENCOUNTER — APPOINTMENT (OUTPATIENT)
Dept: CT IMAGING | Facility: HOSPITAL | Age: 87
End: 2022-10-12
Attending: EMERGENCY MEDICINE
Payer: MEDICARE

## 2022-10-12 ENCOUNTER — APPOINTMENT (OUTPATIENT)
Dept: GENERAL RADIOLOGY | Facility: HOSPITAL | Age: 87
End: 2022-10-12
Attending: EMERGENCY MEDICINE
Payer: MEDICARE

## 2022-10-12 ENCOUNTER — HOSPITAL ENCOUNTER (EMERGENCY)
Facility: HOSPITAL | Age: 87
Discharge: HOME | End: 2022-10-12
Attending: EMERGENCY MEDICINE
Payer: MEDICARE

## 2022-10-12 VITALS
BODY MASS INDEX: 24.1 KG/M2 | OXYGEN SATURATION: 95 % | RESPIRATION RATE: 18 BRPM | HEIGHT: 63 IN | SYSTOLIC BLOOD PRESSURE: 148 MMHG | DIASTOLIC BLOOD PRESSURE: 60 MMHG | HEART RATE: 64 BPM | WEIGHT: 136 LBS | TEMPERATURE: 98 F
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
## 2022-10-12 LAB
ALBUMIN SERPL-MCNC: 3.5 G/DL (ref 3.4–5)
ALBUMIN/GLOB SERPL: 1.2 {RATIO} (ref 1–2)
ALP LIVER SERPL-CCNC: 51 U/L
ALT SERPL-CCNC: 31 U/L
ANION GAP SERPL CALC-SCNC: 3 MMOL/L (ref 0–18)
AST SERPL-CCNC: 20 U/L (ref 15–37)
ATRIAL RATE: 75 BPM
BASOPHILS # BLD AUTO: 0.04 X10(3) UL (ref 0–0.2)
BASOPHILS NFR BLD AUTO: 0.5 %
BILIRUB SERPL-MCNC: 1.2 MG/DL (ref 0.1–2)
BILIRUB UR QL STRIP.AUTO: NEGATIVE
BUN BLD-MCNC: 23 MG/DL (ref 7–18)
CALCIUM BLD-MCNC: 9.6 MG/DL (ref 8.5–10.1)
CHLORIDE SERPL-SCNC: 105 MMOL/L (ref 98–112)
CLARITY UR REFRACT.AUTO: CLEAR
CO2 SERPL-SCNC: 28 MMOL/L (ref 21–32)
COLOR UR AUTO: YELLOW
CREAT BLD-MCNC: 1.11 MG/DL
EOSINOPHIL # BLD AUTO: 0.12 X10(3) UL (ref 0–0.7)
EOSINOPHIL NFR BLD AUTO: 1.5 %
ERYTHROCYTE [DISTWIDTH] IN BLOOD BY AUTOMATED COUNT: 14.6 %
GFR SERPLBLD BASED ON 1.73 SQ M-ARVRAT: 63 ML/MIN/1.73M2 (ref 60–?)
GLOBULIN PLAS-MCNC: 3 G/DL (ref 2.8–4.4)
GLUCOSE BLD-MCNC: 134 MG/DL (ref 70–99)
GLUCOSE UR STRIP.AUTO-MCNC: NEGATIVE MG/DL
HCT VFR BLD AUTO: 39.3 %
HGB BLD-MCNC: 12.7 G/DL
IMM GRANULOCYTES # BLD AUTO: 0.01 X10(3) UL (ref 0–1)
IMM GRANULOCYTES NFR BLD: 0.1 %
KETONES UR STRIP.AUTO-MCNC: NEGATIVE MG/DL
LEUKOCYTE ESTERASE UR QL STRIP.AUTO: NEGATIVE
LYMPHOCYTES # BLD AUTO: 0.59 X10(3) UL (ref 1–4)
LYMPHOCYTES NFR BLD AUTO: 7.5 %
MCH RBC QN AUTO: 23.9 PG (ref 26–34)
MCHC RBC AUTO-ENTMCNC: 32.3 G/DL (ref 31–37)
MCV RBC AUTO: 74 FL
MONOCYTES # BLD AUTO: 0.68 X10(3) UL (ref 0.1–1)
MONOCYTES NFR BLD AUTO: 8.6 %
NEUTROPHILS # BLD AUTO: 6.45 X10 (3) UL (ref 1.5–7.7)
NEUTROPHILS # BLD AUTO: 6.45 X10(3) UL (ref 1.5–7.7)
NEUTROPHILS NFR BLD AUTO: 81.8 %
NITRITE UR QL STRIP.AUTO: NEGATIVE
OSMOLALITY SERPL CALC.SUM OF ELEC: 288 MOSM/KG (ref 275–295)
P AXIS: 69 DEGREES
P-R INTERVAL: 190 MS
PH UR STRIP.AUTO: 7 [PH] (ref 5–8)
PLATELET # BLD AUTO: 137 10(3)UL (ref 150–450)
POTASSIUM SERPL-SCNC: 4.2 MMOL/L (ref 3.5–5.1)
PROT SERPL-MCNC: 6.5 G/DL (ref 6.4–8.2)
PROT UR STRIP.AUTO-MCNC: NEGATIVE MG/DL
Q-T INTERVAL: 388 MS
QRS DURATION: 88 MS
QTC CALCULATION (BEZET): 433 MS
R AXIS: -5 DEGREES
RBC # BLD AUTO: 5.31 X10(6)UL
RBC UR QL AUTO: NEGATIVE
SODIUM SERPL-SCNC: 136 MMOL/L (ref 136–145)
SP GR UR STRIP.AUTO: 1.01 (ref 1–1.03)
T AXIS: 83 DEGREES
TROPONIN I HIGH SENSITIVITY: 20 NG/L
UROBILINOGEN UR STRIP.AUTO-MCNC: 2 MG/DL
VENTRICULAR RATE: 75 BPM
WBC # BLD AUTO: 7.9 X10(3) UL (ref 4–11)

## 2022-10-12 PROCEDURE — 93010 ELECTROCARDIOGRAM REPORT: CPT

## 2022-10-12 PROCEDURE — 71045 X-RAY EXAM CHEST 1 VIEW: CPT | Performed by: EMERGENCY MEDICINE

## 2022-10-12 PROCEDURE — 80053 COMPREHEN METABOLIC PANEL: CPT | Performed by: EMERGENCY MEDICINE

## 2022-10-12 PROCEDURE — 85025 COMPLETE CBC W/AUTO DIFF WBC: CPT | Performed by: EMERGENCY MEDICINE

## 2022-10-12 PROCEDURE — 93005 ELECTROCARDIOGRAM TRACING: CPT

## 2022-10-12 PROCEDURE — 81003 URINALYSIS AUTO W/O SCOPE: CPT | Performed by: EMERGENCY MEDICINE

## 2022-10-12 PROCEDURE — 99284 EMERGENCY DEPT VISIT MOD MDM: CPT

## 2022-10-12 PROCEDURE — 99285 EMERGENCY DEPT VISIT HI MDM: CPT

## 2022-10-12 PROCEDURE — 84484 ASSAY OF TROPONIN QUANT: CPT | Performed by: EMERGENCY MEDICINE

## 2022-10-12 PROCEDURE — 36415 COLL VENOUS BLD VENIPUNCTURE: CPT

## 2022-10-12 PROCEDURE — 72125 CT NECK SPINE W/O DYE: CPT | Performed by: EMERGENCY MEDICINE

## 2022-10-12 PROCEDURE — 70450 CT HEAD/BRAIN W/O DYE: CPT | Performed by: EMERGENCY MEDICINE

## 2022-10-12 NOTE — ED INITIAL ASSESSMENT (HPI)
Pt experienced ground level fall at home, denies hitting his head. No LOC. Reports neck pain. Received COVID and flu vaccine yesterday at his physicians office at the [place].

## 2023-10-23 ENCOUNTER — APPOINTMENT (OUTPATIENT)
Dept: CT IMAGING | Facility: HOSPITAL | Age: 88
End: 2023-10-23
Attending: EMERGENCY MEDICINE
Payer: MEDICARE

## 2023-10-23 ENCOUNTER — HOSPITAL ENCOUNTER (EMERGENCY)
Facility: HOSPITAL | Age: 88
Discharge: HOME | End: 2023-10-23
Attending: EMERGENCY MEDICINE
Payer: MEDICARE

## 2023-10-23 VITALS
OXYGEN SATURATION: 98 % | WEIGHT: 140 LBS | HEART RATE: 61 BPM | SYSTOLIC BLOOD PRESSURE: 126 MMHG | RESPIRATION RATE: 15 BRPM | DIASTOLIC BLOOD PRESSURE: 51 MMHG | BODY MASS INDEX: 25 KG/M2 | TEMPERATURE: 98 F
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
## 2023-10-23 DIAGNOSIS — S09.90XA MINOR HEAD INJURY, INITIAL ENCOUNTER: ICD-10-CM

## 2023-10-23 PROCEDURE — 99284 EMERGENCY DEPT VISIT MOD MDM: CPT

## 2023-10-23 PROCEDURE — 70450 CT HEAD/BRAIN W/O DYE: CPT | Performed by: EMERGENCY MEDICINE

## 2023-10-23 PROCEDURE — 99283 EMERGENCY DEPT VISIT LOW MDM: CPT

## 2023-10-23 NOTE — ED INITIAL ASSESSMENT (HPI)
Pt presents from home with c/o fall, hitting back of head, denies loc. Pt not taking blood thinners. Denies dizziness or lightheadedness. Hematoma to back of head.

## 2023-11-30 ENCOUNTER — APPOINTMENT (OUTPATIENT)
Dept: GENERAL RADIOLOGY | Facility: HOSPITAL | Age: 88
End: 2023-11-30
Attending: EMERGENCY MEDICINE
Payer: MEDICARE

## 2023-11-30 ENCOUNTER — HOSPITAL ENCOUNTER (OUTPATIENT)
Facility: HOSPITAL | Age: 88
Setting detail: OBSERVATION
LOS: 1 days | Discharge: HOME | End: 2023-12-02
Attending: EMERGENCY MEDICINE | Admitting: INTERNAL MEDICINE
Payer: MEDICARE

## 2023-11-30 ENCOUNTER — APPOINTMENT (OUTPATIENT)
Dept: CT IMAGING | Facility: HOSPITAL | Age: 88
End: 2023-11-30
Attending: EMERGENCY MEDICINE
Payer: MEDICARE

## 2023-11-30 DIAGNOSIS — R55 SYNCOPE, UNSPECIFIED SYNCOPE TYPE: Primary | ICD-10-CM

## 2023-11-30 DIAGNOSIS — N17.9 AKI (ACUTE KIDNEY INJURY) (HCC): ICD-10-CM

## 2023-11-30 LAB
ALBUMIN SERPL-MCNC: 4 G/DL (ref 3.4–5)
ALBUMIN/GLOB SERPL: 1.4 {RATIO} (ref 1–2)
ALP LIVER SERPL-CCNC: 60 U/L
ALT SERPL-CCNC: 32 U/L
ANION GAP SERPL CALC-SCNC: 0 MMOL/L (ref 0–18)
APTT PPP: 29.9 SECONDS (ref 23.3–35.6)
AST SERPL-CCNC: 33 U/L (ref 15–37)
BASOPHILS # BLD AUTO: 0.07 X10(3) UL (ref 0–0.2)
BASOPHILS NFR BLD AUTO: 0.7 %
BILIRUB SERPL-MCNC: 0.9 MG/DL (ref 0.1–2)
BILIRUB UR QL STRIP.AUTO: NEGATIVE
BILIRUB UR QL STRIP.AUTO: NEGATIVE
BUN BLD-MCNC: 27 MG/DL (ref 9–23)
CALCIUM BLD-MCNC: 9.5 MG/DL (ref 8.5–10.1)
CHLORIDE SERPL-SCNC: 105 MMOL/L (ref 98–112)
CLARITY UR REFRACT.AUTO: CLEAR
CLARITY UR REFRACT.AUTO: CLEAR
CO2 SERPL-SCNC: 31 MMOL/L (ref 21–32)
CREAT BLD-MCNC: 1.82 MG/DL
EGFRCR SERPLBLD CKD-EPI 2021: 35 ML/MIN/1.73M2 (ref 60–?)
EOSINOPHIL # BLD AUTO: 0.31 X10(3) UL (ref 0–0.7)
EOSINOPHIL NFR BLD AUTO: 3.2 %
ERYTHROCYTE [DISTWIDTH] IN BLOOD BY AUTOMATED COUNT: 14.6 %
GLOBULIN PLAS-MCNC: 2.8 G/DL (ref 2.8–4.4)
GLUCOSE BLD-MCNC: 125 MG/DL (ref 70–99)
GLUCOSE UR STRIP.AUTO-MCNC: NORMAL MG/DL
GLUCOSE UR STRIP.AUTO-MCNC: NORMAL MG/DL
HCT VFR BLD AUTO: 42 %
HGB BLD-MCNC: 13 G/DL
IMM GRANULOCYTES # BLD AUTO: 0.02 X10(3) UL (ref 0–1)
IMM GRANULOCYTES NFR BLD: 0.2 %
INR BLD: 1.07 (ref 0.8–1.2)
KETONES UR STRIP.AUTO-MCNC: NEGATIVE MG/DL
KETONES UR STRIP.AUTO-MCNC: NEGATIVE MG/DL
LEUKOCYTE ESTERASE UR QL STRIP.AUTO: NEGATIVE
LEUKOCYTE ESTERASE UR QL STRIP.AUTO: NEGATIVE
LIPASE SERPL-CCNC: 29 U/L (ref 13–75)
LYMPHOCYTES # BLD AUTO: 1.69 X10(3) UL (ref 1–4)
LYMPHOCYTES NFR BLD AUTO: 17.2 %
MCH RBC QN AUTO: 23 PG (ref 26–34)
MCHC RBC AUTO-ENTMCNC: 31 G/DL (ref 31–37)
MCV RBC AUTO: 74.5 FL
MONOCYTES # BLD AUTO: 0.76 X10(3) UL (ref 0.1–1)
MONOCYTES NFR BLD AUTO: 7.7 %
NEUTROPHILS # BLD AUTO: 6.98 X10 (3) UL (ref 1.5–7.7)
NEUTROPHILS # BLD AUTO: 6.98 X10(3) UL (ref 1.5–7.7)
NEUTROPHILS NFR BLD AUTO: 71 %
NITRITE UR QL STRIP.AUTO: NEGATIVE
NITRITE UR QL STRIP.AUTO: NEGATIVE
OSMOLALITY SERPL CALC.SUM OF ELEC: 289 MOSM/KG (ref 275–295)
PH UR STRIP.AUTO: 5.5 [PH] (ref 5–8)
PH UR STRIP.AUTO: 6 [PH] (ref 5–8)
PLATELET # BLD AUTO: 200 10(3)UL (ref 150–450)
POTASSIUM SERPL-SCNC: 4.5 MMOL/L (ref 3.5–5.1)
PROT SERPL-MCNC: 6.8 G/DL (ref 6.4–8.2)
PROT UR STRIP.AUTO-MCNC: NEGATIVE MG/DL
PROT UR STRIP.AUTO-MCNC: NEGATIVE MG/DL
PROTHROMBIN TIME: 13.9 SECONDS (ref 11.6–14.8)
RBC # BLD AUTO: 5.64 X10(6)UL
RBC UR QL AUTO: NEGATIVE
RBC UR QL AUTO: NEGATIVE
SODIUM SERPL-SCNC: 136 MMOL/L (ref 136–145)
SP GR UR STRIP.AUTO: 1.01 (ref 1–1.03)
SP GR UR STRIP.AUTO: 1.01 (ref 1–1.03)
TROPONIN I SERPL HS-MCNC: 19 NG/L
UROBILINOGEN UR STRIP.AUTO-MCNC: NORMAL MG/DL
UROBILINOGEN UR STRIP.AUTO-MCNC: NORMAL MG/DL
WBC # BLD AUTO: 9.8 X10(3) UL (ref 4–11)

## 2023-11-30 PROCEDURE — 84484 ASSAY OF TROPONIN QUANT: CPT | Performed by: EMERGENCY MEDICINE

## 2023-11-30 PROCEDURE — 81003 URINALYSIS AUTO W/O SCOPE: CPT | Performed by: INTERNAL MEDICINE

## 2023-11-30 PROCEDURE — 96361 HYDRATE IV INFUSION ADD-ON: CPT

## 2023-11-30 PROCEDURE — 80053 COMPREHEN METABOLIC PANEL: CPT | Performed by: EMERGENCY MEDICINE

## 2023-11-30 PROCEDURE — 93010 ELECTROCARDIOGRAM REPORT: CPT

## 2023-11-30 PROCEDURE — 85025 COMPLETE CBC W/AUTO DIFF WBC: CPT | Performed by: EMERGENCY MEDICINE

## 2023-11-30 PROCEDURE — 71045 X-RAY EXAM CHEST 1 VIEW: CPT | Performed by: EMERGENCY MEDICINE

## 2023-11-30 PROCEDURE — 99285 EMERGENCY DEPT VISIT HI MDM: CPT

## 2023-11-30 PROCEDURE — 70450 CT HEAD/BRAIN W/O DYE: CPT | Performed by: EMERGENCY MEDICINE

## 2023-11-30 PROCEDURE — 96360 HYDRATION IV INFUSION INIT: CPT

## 2023-11-30 PROCEDURE — 85610 PROTHROMBIN TIME: CPT | Performed by: EMERGENCY MEDICINE

## 2023-11-30 PROCEDURE — 81003 URINALYSIS AUTO W/O SCOPE: CPT | Performed by: EMERGENCY MEDICINE

## 2023-11-30 PROCEDURE — 83690 ASSAY OF LIPASE: CPT | Performed by: EMERGENCY MEDICINE

## 2023-11-30 PROCEDURE — 85730 THROMBOPLASTIN TIME PARTIAL: CPT | Performed by: EMERGENCY MEDICINE

## 2023-11-30 RX ORDER — AMLODIPINE BESYLATE 5 MG/1
5 TABLET ORAL DAILY
Status: DISCONTINUED | OUTPATIENT
Start: 2023-12-01 | End: 2023-12-02

## 2023-11-30 RX ORDER — ATORVASTATIN CALCIUM 20 MG/1
20 TABLET, FILM COATED ORAL NIGHTLY
Status: DISCONTINUED | OUTPATIENT
Start: 2023-11-30 | End: 2023-12-02

## 2023-11-30 RX ORDER — SODIUM CHLORIDE 9 MG/ML
INJECTION, SOLUTION INTRAVENOUS CONTINUOUS
Status: DISCONTINUED | OUTPATIENT
Start: 2023-11-30 | End: 2023-12-02

## 2023-11-30 RX ORDER — ASPIRIN 81 MG/1
243 TABLET, CHEWABLE ORAL ONCE
Status: COMPLETED | OUTPATIENT
Start: 2023-11-30 | End: 2023-11-30

## 2023-11-30 RX ORDER — MULTIPLE VITAMINS W/ MINERALS TAB 9MG-400MCG
1 TAB ORAL DAILY
Status: DISCONTINUED | OUTPATIENT
Start: 2023-12-01 | End: 2023-12-02

## 2023-11-30 RX ORDER — ACETAMINOPHEN 325 MG/1
650 TABLET ORAL EVERY 6 HOURS PRN
Status: DISCONTINUED | OUTPATIENT
Start: 2023-11-30 | End: 2023-12-02

## 2023-11-30 RX ORDER — MELATONIN
200 DAILY
Status: DISCONTINUED | OUTPATIENT
Start: 2023-12-01 | End: 2023-12-02

## 2023-11-30 RX ORDER — HEPARIN SODIUM 5000 [USP'U]/ML
5000 INJECTION, SOLUTION INTRAVENOUS; SUBCUTANEOUS EVERY 8 HOURS SCHEDULED
Status: DISCONTINUED | OUTPATIENT
Start: 2023-11-30 | End: 2023-12-02

## 2023-11-30 RX ORDER — ASPIRIN 81 MG/1
TABLET, CHEWABLE ORAL
Status: COMPLETED
Start: 2023-11-30 | End: 2023-11-30

## 2023-11-30 RX ORDER — ONDANSETRON 2 MG/ML
4 INJECTION INTRAMUSCULAR; INTRAVENOUS EVERY 6 HOURS PRN
Status: DISCONTINUED | OUTPATIENT
Start: 2023-11-30 | End: 2023-12-02

## 2023-11-30 RX ORDER — LEVOTHYROXINE SODIUM 0.07 MG/1
75 TABLET ORAL
Status: DISCONTINUED | OUTPATIENT
Start: 2023-12-01 | End: 2023-12-02

## 2023-11-30 RX ORDER — ASPIRIN 81 MG/1
81 TABLET ORAL NIGHTLY
Status: DISCONTINUED | OUTPATIENT
Start: 2023-11-30 | End: 2023-12-02

## 2023-11-30 RX ORDER — EZETIMIBE 10 MG/1
10 TABLET ORAL NIGHTLY
Status: DISCONTINUED | OUTPATIENT
Start: 2023-11-30 | End: 2023-12-02

## 2023-11-30 RX ORDER — PANTOPRAZOLE SODIUM 20 MG/1
20 TABLET, DELAYED RELEASE ORAL
Status: DISCONTINUED | OUTPATIENT
Start: 2023-12-01 | End: 2023-12-02

## 2023-11-30 NOTE — ED INITIAL ASSESSMENT (HPI)
Patient arrived per [facility] who were called to the scene for unresponsive patient. Per medics patient wa on couch appeared unresponsive. Medics performed sternal rub which patient responded to immediately to. Patient was found to be bradycardic HR in the 30's but now in the 50's-60's, Patient recalls events prior to paramedic's arrival but not when wife called or when they arrived. Currently A/O x3 without complaints. Patient had fall about a month ago where he was seen in ED also.

## 2023-11-30 NOTE — ED QUICK NOTES
Orders for admission, patient is aware of plan and ready to go upstairs. Any questions, please call ED RN Tati at extension [phone].      Patient Covid vaccination status: Fully vaccinated     COVID Test Ordered in ED: None    COVID Suspicion at Admission: N/A    Running Infusions:  Normal Saline 1000 Ml bolus/ Rigo@[employer]  Mental Status/LOC at time of transport: A/O x3    Other pertinent information:   CIWA score: N/A   NIH score:  N/A

## 2023-12-01 ENCOUNTER — APPOINTMENT (OUTPATIENT)
Dept: CV DIAGNOSTICS | Facility: HOSPITAL | Age: 88
End: 2023-12-01
Attending: INTERNAL MEDICINE
Payer: MEDICARE

## 2023-12-01 LAB
ANION GAP SERPL CALC-SCNC: 5 MMOL/L (ref 0–18)
BUN BLD-MCNC: 21 MG/DL (ref 9–23)
CALCIUM BLD-MCNC: 9.2 MG/DL (ref 8.5–10.1)
CHLORIDE SERPL-SCNC: 113 MMOL/L (ref 98–112)
CO2 SERPL-SCNC: 24 MMOL/L (ref 21–32)
CREAT BLD-MCNC: 1.19 MG/DL
EGFRCR SERPLBLD CKD-EPI 2021: 58 ML/MIN/1.73M2 (ref 60–?)
GLUCOSE BLD-MCNC: 78 MG/DL (ref 70–99)
OSMOLALITY SERPL CALC.SUM OF ELEC: 296 MOSM/KG (ref 275–295)
POTASSIUM SERPL-SCNC: 4.2 MMOL/L (ref 3.5–5.1)
SODIUM SERPL-SCNC: 142 MMOL/L (ref 136–145)

## 2023-12-01 PROCEDURE — 97116 GAIT TRAINING THERAPY: CPT

## 2023-12-01 PROCEDURE — 97161 PT EVAL LOW COMPLEX 20 MIN: CPT

## 2023-12-01 PROCEDURE — 93306 TTE W/DOPPLER COMPLETE: CPT | Performed by: INTERNAL MEDICINE

## 2023-12-01 PROCEDURE — 97165 OT EVAL LOW COMPLEX 30 MIN: CPT

## 2023-12-01 PROCEDURE — 97535 SELF CARE MNGMENT TRAINING: CPT

## 2023-12-01 PROCEDURE — 78452 HT MUSCLE IMAGE SPECT MULT: CPT | Performed by: INTERNAL MEDICINE

## 2023-12-01 PROCEDURE — 80048 BASIC METABOLIC PNL TOTAL CA: CPT | Performed by: INTERNAL MEDICINE

## 2023-12-01 PROCEDURE — 93018 CV STRESS TEST I&R ONLY: CPT | Performed by: INTERNAL MEDICINE

## 2023-12-01 PROCEDURE — 93017 CV STRESS TEST TRACING ONLY: CPT | Performed by: INTERNAL MEDICINE

## 2023-12-01 RX ORDER — MELATONIN
3 ONCE
Status: COMPLETED | OUTPATIENT
Start: 2023-12-01 | End: 2023-12-01

## 2023-12-01 RX ORDER — REGADENOSON 0.08 MG/ML
INJECTION, SOLUTION INTRAVENOUS
Status: DISPENSED
Start: 2023-12-01 | End: 2023-12-02

## 2023-12-01 NOTE — PHYSICAL THERAPY NOTE
PHYSICAL THERAPY EVALUATION - INPATIENT     Room Number: 8193/4040-K  Evaluation Date: 12/1/2023  Type of Evaluation: Initial  Physician Order: PT Eval and Treat    Presenting Problem: AMS, syncope  Co-Morbidities : OA, SDH, HTN, PVD, CAD  Reason for Therapy: Mobility Dysfunction and Discharge Planning    History related to current admission: Patient is a 80year old male admitted on 11/30/2023 from home for AMS and syncope. Pt diagnosed with bradycardia. CT head (-). ASSESSMENT   In this PT evaluation, the patient presents with the following impairments balance impairments and poor safety awareness. These impairments and comorbidities manifest themselves as functional limitations in independent bed mobility, transfers, and gait. The patient is below baseline and would benefit from skilled inpatient PT to address the above deficits to assist patient in returning to prior to level of function. On the Modified Combs Balance Scale, in which a score below 23 is significant for fall risk, the Pt scored 18/28 indicating that the patient is at a high risk for falls. Functional outcome measures completed include AMPA. The AM-PAC '6-Clicks' Inpatient Basic Mobility Short Form was completed and this patient is demonstrating a Approx Degree of Impairment: 35.83%  degree of impairment in mobility. Research supports that patients with this level of impairment may benefit from [address] [address] . DISCHARGE RECOMMENDATIONS  PT Discharge Recommendations: Home with home health PT    PLAN  PT Treatment Plan: Bed mobility; Endurance; Energy conservation;Balance training;Transfer training;Strengthening;Gait training;Patient education  Rehab Potential : Good  Frequency (Obs): 3-5x/week  Number of Visits to Meet Established Goals: 3      CURRENT GOALS    Goal #1 Patient is able to demonstrate supine - sit EOB @ level: supervision     Goal #2 Patient is able to demonstrate transfers EOB to/from [facility] at assistance level: supervision Goal #3 Patient is able to ambulate 150 feet with assist device: walker - rolling at assistance level: supervision     Goal #4    Goal #5    Goal #6    Goal Comments: Goals established on 2023    HOME SITUATION  Type of Home: House   Home Layout: One level                Lives With: Spouse  Drives: Yes  Patient Owned Equipment: Rolling walker  Patient Regularly Uses: Glasses    Prior Level of [place]: Pt lives with spouse in single story home. Pt typically independent with ADL and mobility. Pt does not use RW or cane for ambulation. SUBJECTIVE  \"I think I need to drink more water. \"       OBJECTIVE  Precautions: Bed/chair alarm  Fall Risk: High fall risk    WEIGHT BEARING RESTRICTION  Weight Bearing Restriction: None                PAIN ASSESSMENT  Ratin          COGNITION  Following Commands:  follows one step commands consistently  Safety Judgement:  decreased awareness of need for assistance and decreased awareness of need for safety  Awareness of Errors:  decreased awareness of errors     RANGE OF MOTION AND STRENGTH ASSESSMENT  Upper extremity ROM and strength are within functional limits     Lower extremity ROM is within functional limits     Lower extremity strength is within functional limits except for the following:    Right Knee extension  4/5  Left Knee extension  4/5  Right Dorsiflexion  4/5  Left Dorsiflexion  4/5      BALANCE  Static Sitting: Good  Dynamic Sitting: Good  Static Standing: Fair -  Dynamic Standing: Poor +    ADDITIONAL TESTS                                    ACTIVITY TOLERANCE                         O2 WALK       NEUROLOGICAL FINDINGS                        AM-PAC '6-Clicks' INPATIENT SHORT FORM - BASIC MOBILITY  How much difficulty does the patient currently have. ..   Patient Difficulty: Turning over in bed (including adjusting bedclothes, sheets and blankets)?: None   Patient Difficulty: Sitting down on and standing up from a chair with arms (e.g., wheelchair, bedside commode, etc.): A Little   Patient Difficulty: Moving from lying on back to sitting on the side of the bed?: None   How much help from another person does the patient currently need. .. Help from Another: Moving to and from a bed to a chair (including a wheelchair)?: A Little   Help from Another: Need to walk in hospital room?: A Little   Help from Another: Climbing 3-5 steps with a railing?: A Little       AM-PAC Score:  Raw Score: 20   Approx Degree of Impairment: 35.83%   Standardized Score (AM-PAC Scale): 47.67   CMS Modifier (G-Code): CJ    FUNCTIONAL ABILITY STATUS  Gait Assessment   Functional Mobility/Gait Assessment  Gait Assistance: Minimum assistance  Distance (ft): 250  Assistive Device: None  Pattern: R Foot flat;L Foot flat (excessive lateral sway)    Skilled Therapy Provided   Pt received supine in bed and is agreeable to therapy. Pt supine-sit without assist. Pt demonstrates good static sitting balance at EOB. Pt sit-stand with supervision. Pt ambulated 250ft with primarily CGA. Pt ambulates with flat foot initial contact and excessive lateral sway. Pt had 1 LOB in bathroom requiring MIN assist for balance recovery. Pt given VC for safety and pacing especially with turning. Discussed recommendation of RW use for safety and stability. Pt participated in modified valentino balance assessment    Modified Valentino Balance Test=18/28    1. Sitting to standing                                                                   4  2. Standing unsupported with eyes closed                                 4  3. Reaching forward with outstretched arm while standing        2  4.  object from the floor from a standing position            2  5. Turning to look over left and right shoulders while standing   2  6.  Standing unsupported one foot in front                                   2  7. Standing on one leg                                                                2    Pt returned to sitting up in bedside chair. Discussed activity recommendations. Pt left with call light in reach. Bed Mobility:  Rolling: MOD I  Supine to sit: MOD I   Sit to supine: NT     Transfer Mobility:  Sit to stand: supervision   Stand to sit: supervision  Gait = MIN    Therapist's Comments: Recommend use of RW for ambulation    Exercise/Education Provided:  Bed mobility  Energy conservation  Functional activity tolerated  Gait training  Posture  Strengthening  Transfer training    Patient End of Session: Up in chair;Needs met;Call light within reach; All patient questions and concerns addressed; Family present      Patient Evaluation Complexity Level:  History Moderate - 1 or 2 personal factors and/or co-morbidities   Examination of body systems Moderate - addressing a total of 3 or more elements   Clinical Presentation Low - Stable   Clinical Decision Making Low - Stable       PT Session Time: 30 minutes  Gait Training: 10 minutes

## 2023-12-01 NOTE — CM/SW NOTE
12/01/23 1500   CM/SW Referral Data   Referral Source Social Work (self-referral)   Reason for Referral Discharge planning   Informant Patient;EMR   [address]   Patient lives with Spouse/Significant other   Discharge Needs   Anticipated D/C needs Home health care     Patient is a 81 y/o male who admitted with JUDY (acute kidney injury) and Syncope. PT recommending HH. Attempted to meet with patient however he was out of the room. Met with wife who was at bedside to discuss discharge planning. They live together in [place]. She is about 81 y/o and no longer drives. She shared that patient still drives. She also shared he can be forgetful at times. Encouraged her to discuss these things with the doctors as well. Discussed PT rec of HH, she will discuss it with patient. She was agreeable with SW sending referrals. Sent referrals in aidin. Awaiting accepting provider and patient choice. SW will remain available.      NAHUM Russo  Discharge Planner  [phone]

## 2023-12-01 NOTE — PLAN OF CARE
Assumed care of pt from ED around 2030  AxO x4, R/A, up SBA  NSR on tele, no cardiac symptoms  Pt denies any pain  IVF running at 83 ml/hr cont  Skin clean, dry, and intact, no wounds present  Continent of bladder and bowel  Fall precautions in place, bed alarm on  Pt updated on plan of care, all needs met at this time            Problem: CARDIOVASCULAR - ADULT  Goal: Maintains optimal cardiac output and hemodynamic stability  Description: INTERVENTIONS:  - Monitor vital signs, rhythm, and trends  - Monitor for bleeding, hypotension and signs of decreased cardiac output  - Evaluate effectiveness of vasoactive medications to optimize hemodynamic stability  - Monitor arterial and/or venous puncture sites for bleeding and/or hematoma  - Assess quality of pulses, skin color and temperature  - Assess for signs of decreased coronary artery perfusion - ex.  Angina  - Evaluate fluid balance, assess for edema, trend weights  Outcome: Progressing  Goal: Absence of cardiac arrhythmias or at baseline  Description: INTERVENTIONS:  - Continuous cardiac monitoring, monitor vital signs, obtain 12 lead EKG if indicated  - Evaluate effectiveness of antiarrhythmic and heart rate control medications as ordered  - Initiate emergency measures for life threatening arrhythmias  - Monitor electrolytes and administer replacement therapy as ordered  Outcome: Progressing     Problem: PAIN - ADULT  Goal: Verbalizes/displays adequate comfort level or patient's stated pain goal  Description: INTERVENTIONS:  - Encourage pt to monitor pain and request assistance  - Assess pain using appropriate pain scale  - Administer analgesics based on type and severity of pain and evaluate response  - Implement non-pharmacological measures as appropriate and evaluate response  - Consider cultural and social influences on pain and pain management  - Manage/alleviate anxiety  - Utilize distraction and/or relaxation techniques  - Monitor for opioid side effects  - Notify MD/LIP if interventions unsuccessful or patient reports new pain  - Anticipate increased pain with activity and pre-medicate as appropriate  Outcome: Progressing     Problem: SAFETY ADULT - FALL  Goal: Free from fall injury  Description: INTERVENTIONS:  - Assess pt frequently for physical needs  - Identify cognitive and physical deficits and behaviors that affect risk of falls.   - [place] fall precautions as indicated by assessment.  - Educate pt/family on patient safety including physical limitations  - Instruct pt to call for assistance with activity based on assessment  - Modify environment to reduce risk of injury  - Provide assistive devices as appropriate  - Consider OT/PT consult to assist with strengthening/mobility  - Encourage toileting schedule  Outcome: Progressing     Problem: DISCHARGE PLANNING  Goal: Discharge to home or other facility with appropriate resources  Description: INTERVENTIONS:  - Identify barriers to discharge w/pt and caregiver  - Include patient/family/discharge partner in discharge planning  - Arrange for needed discharge resources and transportation as appropriate  - Identify discharge learning needs (meds, wound care, etc)  - Arrange for interpreters to assist at discharge as needed  - Consider post-discharge preferences of patient/family/discharge partner  - Complete POLST form as appropriate  - Assess patient's ability to be responsible for managing their own health  - Refer to Case Management Department for coordinating discharge planning if the patient needs post-hospital services based on physician/LIP order or complex needs related to functional status, cognitive ability or social support system  Outcome: Progressing

## 2023-12-01 NOTE — PROGRESS NOTES
Received patient at 0730. A+Ox4. Glasses. VSS on RA. NSR with 1st degree AVB on tele. No c.o dizziness. Ortho (-). Voids in urinal. IVF infusing. Echo done this AM. Plan for Stress test this afternoon. Up with SBA. Pt updated on current POC. All needs currently met.

## 2023-12-01 NOTE — PROGRESS NOTES
11/30/23 2026 11/30/23 2031 11/30/23 2035   Vital Signs   /62 157/59 140/57   MAP (mmHg) 84 84 82   BP Location Right arm Right arm Right arm   BP Method Automatic Automatic Automatic   Patient Position Lying Sitting Standing

## 2023-12-02 VITALS
TEMPERATURE: 98 F | RESPIRATION RATE: 16 BRPM | BODY MASS INDEX: 25.19 KG/M2 | SYSTOLIC BLOOD PRESSURE: 148 MMHG | WEIGHT: 136.88 LBS | HEART RATE: 78 BPM | DIASTOLIC BLOOD PRESSURE: 60 MMHG | HEIGHT: 62 IN | OXYGEN SATURATION: 97 %

## 2023-12-02 LAB
ANION GAP SERPL CALC-SCNC: 3 MMOL/L (ref 0–18)
BUN BLD-MCNC: 16 MG/DL (ref 9–23)
CALCIUM BLD-MCNC: 9.6 MG/DL (ref 8.5–10.1)
CHLORIDE SERPL-SCNC: 110 MMOL/L (ref 98–112)
CO2 SERPL-SCNC: 29 MMOL/L (ref 21–32)
CREAT BLD-MCNC: 1.16 MG/DL
EGFRCR SERPLBLD CKD-EPI 2021: 59 ML/MIN/1.73M2 (ref 60–?)
GLUCOSE BLD-MCNC: 80 MG/DL (ref 70–99)
MAGNESIUM SERPL-MCNC: 2.1 MG/DL (ref 1.6–2.6)
OSMOLALITY SERPL CALC.SUM OF ELEC: 294 MOSM/KG (ref 275–295)
POTASSIUM SERPL-SCNC: 4.4 MMOL/L (ref 3.5–5.1)
SODIUM SERPL-SCNC: 142 MMOL/L (ref 136–145)
T3FREE SERPL-MCNC: 2.07 PG/ML (ref 2.4–4.2)
T4 FREE SERPL-MCNC: 1.3 NG/DL (ref 0.8–1.7)
TSI SER-ACNC: 0.14 MIU/ML (ref 0.36–3.74)

## 2023-12-02 PROCEDURE — 80048 BASIC METABOLIC PNL TOTAL CA: CPT | Performed by: INTERNAL MEDICINE

## 2023-12-02 PROCEDURE — 84481 FREE ASSAY (FT-3): CPT | Performed by: INTERNAL MEDICINE

## 2023-12-02 PROCEDURE — 83735 ASSAY OF MAGNESIUM: CPT | Performed by: INTERNAL MEDICINE

## 2023-12-02 PROCEDURE — 84443 ASSAY THYROID STIM HORMONE: CPT | Performed by: INTERNAL MEDICINE

## 2023-12-02 PROCEDURE — 84439 ASSAY OF FREE THYROXINE: CPT | Performed by: INTERNAL MEDICINE

## 2023-12-02 NOTE — PROGRESS NOTES
NURSING DISCHARGE NOTE    Discharged Home via Wheelchair. Accompanied by Family member  Belongings Taken by patient/family. Pt is assessed and ready for discharge. Instructions and follow ups gone over with patient and son at bedside. Iv DC'd. Tele removed. To lobby via [facility] with son to drive home.

## 2023-12-02 NOTE — CM/SW NOTE
12/02/23 1200   Discharge disposition   Expected discharge disposition Home or [address] [place] [address]   Patient Declines Recommended Services Yes     Spoke with patient regarding discharge planning. Informed him of the PT/OT rec of , he adamantly declined HH. Explained benefits of [place] and he declined. Informed him if he changes his mind after discharge he will need to follow up with his PCP for [place]. He understood. Updated RN. SW/CM to remain available for support and/or discharge planning.     Claudean Pean, LSW  Discharge Planner  [phone]

## 2023-12-02 NOTE — PLAN OF CARE
Patient alert and oriented x4. Resp regular and easy. On RA. Sinus arrhythmia w/ first degree AVB in tele. Up w/ SBA. Continent of B&B. No pain complained. Bed alarm on. Call light w/in reach. Problem: CARDIOVASCULAR - ADULT  Goal: Maintains optimal cardiac output and hemodynamic stability  Description: INTERVENTIONS:  - Monitor vital signs, rhythm, and trends  - Monitor for bleeding, hypotension and signs of decreased cardiac output  - Evaluate effectiveness of vasoactive medications to optimize hemodynamic stability  - Monitor arterial and/or venous puncture sites for bleeding and/or hematoma  - Assess quality of pulses, skin color and temperature  - Assess for signs of decreased coronary artery perfusion - ex.  Angina  - Evaluate fluid balance, assess for edema, trend weights  Outcome: Progressing  Goal: Absence of cardiac arrhythmias or at baseline  Description: INTERVENTIONS:  - Continuous cardiac monitoring, monitor vital signs, obtain 12 lead EKG if indicated  - Evaluate effectiveness of antiarrhythmic and heart rate control medications as ordered  - Initiate emergency measures for life threatening arrhythmias  - Monitor electrolytes and administer replacement therapy as ordered  Outcome: Progressing     Problem: PAIN - ADULT  Goal: Verbalizes/displays adequate comfort level or patient's stated pain goal  Description: INTERVENTIONS:  - Encourage pt to monitor pain and request assistance  - Assess pain using appropriate pain scale  - Administer analgesics based on type and severity of pain and evaluate response  - Implement non-pharmacological measures as appropriate and evaluate response  - Consider cultural and social influences on pain and pain management  - Manage/alleviate anxiety  - Utilize distraction and/or relaxation techniques  - Monitor for opioid side effects  - Notify MD/LIP if interventions unsuccessful or patient reports new pain  - Anticipate increased pain with activity and pre-medicate as appropriate  Outcome: Progressing     Problem: SAFETY ADULT - FALL  Goal: Free from fall injury  Description: INTERVENTIONS:  - Assess pt frequently for physical needs  - Identify cognitive and physical deficits and behaviors that affect risk of falls.   - [place] fall precautions as indicated by assessment.  - Educate pt/family on patient safety including physical limitations  - Instruct pt to call for assistance with activity based on assessment  - Modify environment to reduce risk of injury  - Provide assistive devices as appropriate  - Consider OT/PT consult to assist with strengthening/mobility  - Encourage toileting schedule  Outcome: Progressing     Problem: DISCHARGE PLANNING  Goal: Discharge to home or other facility with appropriate resources  Description: INTERVENTIONS:  - Identify barriers to discharge w/pt and caregiver  - Include patient/family/discharge partner in discharge planning  - Arrange for needed discharge resources and transportation as appropriate  - Identify discharge learning needs (meds, wound care, etc)  - Arrange for interpreters to assist at discharge as needed  - Consider post-discharge preferences of patient/family/discharge partner  - Complete POLST form as appropriate  - Assess patient's ability to be responsible for managing their own health  - Refer to Case Management Department for coordinating discharge planning if the patient needs post-hospital services based on physician/LIP order or complex needs related to functional status, cognitive ability or social support system  Outcome: Progressing

## 2023-12-02 NOTE — PLAN OF CARE
Problem: CARDIOVASCULAR - ADULT  Goal: Maintains optimal cardiac output and hemodynamic stability  Description: INTERVENTIONS:  - Monitor vital signs, rhythm, and trends  - Monitor for bleeding, hypotension and signs of decreased cardiac output  - Evaluate effectiveness of vasoactive medications to optimize hemodynamic stability  - Monitor arterial and/or venous puncture sites for bleeding and/or hematoma  - Assess quality of pulses, skin color and temperature  - Assess for signs of decreased coronary artery perfusion - ex.  Angina  - Evaluate fluid balance, assess for edema, trend weights  Outcome: Adequate for Discharge  Goal: Absence of cardiac arrhythmias or at baseline  Description: INTERVENTIONS:  - Continuous cardiac monitoring, monitor vital signs, obtain 12 lead EKG if indicated  - Evaluate effectiveness of antiarrhythmic and heart rate control medications as ordered  - Initiate emergency measures for life threatening arrhythmias  - Monitor electrolytes and administer replacement therapy as ordered  Outcome: Adequate for Discharge     Problem: PAIN - ADULT  Goal: Verbalizes/displays adequate comfort level or patient's stated pain goal  Description: INTERVENTIONS:  - Encourage pt to monitor pain and request assistance  - Assess pain using appropriate pain scale  - Administer analgesics based on type and severity of pain and evaluate response  - Implement non-pharmacological measures as appropriate and evaluate response  - Consider cultural and social influences on pain and pain management  - Manage/alleviate anxiety  - Utilize distraction and/or relaxation techniques  - Monitor for opioid side effects  - Notify MD/LIP if interventions unsuccessful or patient reports new pain  - Anticipate increased pain with activity and pre-medicate as appropriate  Outcome: Adequate for Discharge     Problem: SAFETY ADULT - FALL  Goal: Free from fall injury  Description: INTERVENTIONS:  - Assess pt frequently for physical needs  - Identify cognitive and physical deficits and behaviors that affect risk of falls.   - [place] fall precautions as indicated by assessment.  - Educate pt/family on patient safety including physical limitations  - Instruct pt to call for assistance with activity based on assessment  - Modify environment to reduce risk of injury  - Provide assistive devices as appropriate  - Consider OT/PT consult to assist with strengthening/mobility  - Encourage toileting schedule  Outcome: Adequate for Discharge     Problem: DISCHARGE PLANNING  Goal: Discharge to home or other facility with appropriate resources  Description: INTERVENTIONS:  - Identify barriers to discharge w/pt and caregiver  - Include patient/family/discharge partner in discharge planning  - Arrange for needed discharge resources and transportation as appropriate  - Identify discharge learning needs (meds, wound care, etc)  - Arrange for interpreters to assist at discharge as needed  - Consider post-discharge preferences of patient/family/discharge partner  - Complete POLST form as appropriate  - Assess patient's ability to be responsible for managing their own health  - Refer to Case Management Department for coordinating discharge planning if the patient needs post-hospital services based on physician/LIP order or complex needs related to functional status, cognitive ability or social support system  Outcome: Adequate for Discharge

## 2024-10-23 ENCOUNTER — HOSPITAL ENCOUNTER (INPATIENT)
Facility: HOSPITAL | Age: 89
LOS: 1 days | Discharge: HOME | End: 2024-10-25
Attending: EMERGENCY MEDICINE | Admitting: INTERNAL MEDICINE
Payer: MEDICARE

## 2024-10-23 ENCOUNTER — APPOINTMENT (OUTPATIENT)
Dept: MRI IMAGING | Facility: HOSPITAL | Age: 89
End: 2024-10-23
Attending: Other
Payer: MEDICARE

## 2024-10-23 ENCOUNTER — APPOINTMENT (OUTPATIENT)
Dept: CT IMAGING | Facility: HOSPITAL | Age: 89
End: 2024-10-23
Attending: EMERGENCY MEDICINE
Payer: MEDICARE

## 2024-10-23 ENCOUNTER — NURSE ONLY (OUTPATIENT)
Dept: ELECTROPHYSIOLOGY | Facility: HOSPITAL | Age: 89
End: 2024-10-23
Attending: Other
Payer: MEDICARE

## 2024-10-23 ENCOUNTER — APPOINTMENT (OUTPATIENT)
Dept: GENERAL RADIOLOGY | Facility: HOSPITAL | Age: 89
End: 2024-10-23
Attending: EMERGENCY MEDICINE
Payer: MEDICARE

## 2024-10-23 DIAGNOSIS — R55 SYNCOPE, UNSPECIFIED SYNCOPE TYPE: Primary | ICD-10-CM

## 2024-10-23 PROBLEM — Z86.79 HISTORY OF SUBDURAL HEMORRHAGE: Status: ACTIVE | Noted: 2021-05-28

## 2024-10-23 PROBLEM — R40.4 UNRESPONSIVE EPISODE: Status: ACTIVE | Noted: 2024-10-23

## 2024-10-23 LAB
ALBUMIN SERPL-MCNC: 4.2 G/DL (ref 3.2–4.8)
ALBUMIN/GLOB SERPL: 2 {RATIO} (ref 1–2)
ALP LIVER SERPL-CCNC: 51 U/L
ALT SERPL-CCNC: 24 U/L
ANION GAP SERPL CALC-SCNC: 2 MMOL/L (ref 0–18)
AST SERPL-CCNC: 38 U/L (ref ?–34)
BASOPHILS # BLD AUTO: 0.06 X10(3) UL (ref 0–0.2)
BASOPHILS NFR BLD AUTO: 1.1 %
BILIRUB SERPL-MCNC: 0.9 MG/DL (ref 0.2–0.9)
BILIRUB UR QL STRIP.AUTO: NEGATIVE
BUN BLD-MCNC: 17 MG/DL (ref 9–23)
CALCIUM BLD-MCNC: 10.1 MG/DL (ref 8.7–10.4)
CHLORIDE SERPL-SCNC: 105 MMOL/L (ref 98–112)
CLARITY UR REFRACT.AUTO: CLEAR
CO2 SERPL-SCNC: 30 MMOL/L (ref 21–32)
CREAT BLD-MCNC: 1.32 MG/DL
EGFRCR SERPLBLD CKD-EPI 2021: 51 ML/MIN/1.73M2 (ref 60–?)
EOSINOPHIL # BLD AUTO: 0.31 X10(3) UL (ref 0–0.7)
EOSINOPHIL NFR BLD AUTO: 5.5 %
ERYTHROCYTE [DISTWIDTH] IN BLOOD BY AUTOMATED COUNT: 14.6 %
GLOBULIN PLAS-MCNC: 2.1 G/DL (ref 2–3.5)
GLUCOSE BLD-MCNC: 161 MG/DL (ref 70–99)
GLUCOSE BLD-MCNC: 169 MG/DL (ref 70–99)
GLUCOSE UR STRIP.AUTO-MCNC: NORMAL MG/DL
HCT VFR BLD AUTO: 37.2 %
HGB BLD-MCNC: 12.1 G/DL
IMM GRANULOCYTES # BLD AUTO: 0.02 X10(3) UL (ref 0–1)
IMM GRANULOCYTES NFR BLD: 0.4 %
KETONES UR STRIP.AUTO-MCNC: NEGATIVE MG/DL
LEUKOCYTE ESTERASE UR QL STRIP.AUTO: NEGATIVE
LYMPHOCYTES # BLD AUTO: 1.19 X10(3) UL (ref 1–4)
LYMPHOCYTES NFR BLD AUTO: 21.3 %
MCH RBC QN AUTO: 23.4 PG (ref 26–34)
MCHC RBC AUTO-ENTMCNC: 32.5 G/DL (ref 31–37)
MCV RBC AUTO: 71.8 FL
MONOCYTES # BLD AUTO: 0.5 X10(3) UL (ref 0.1–1)
MONOCYTES NFR BLD AUTO: 8.9 %
NEUTROPHILS # BLD AUTO: 3.52 X10 (3) UL (ref 1.5–7.7)
NEUTROPHILS # BLD AUTO: 3.52 X10(3) UL (ref 1.5–7.7)
NEUTROPHILS NFR BLD AUTO: 62.8 %
NITRITE UR QL STRIP.AUTO: NEGATIVE
OSMOLALITY SERPL CALC.SUM OF ELEC: 289 MOSM/KG (ref 275–295)
PH UR STRIP.AUTO: 7 [PH] (ref 5–8)
PLATELET # BLD AUTO: 181 10(3)UL (ref 150–450)
POTASSIUM SERPL-SCNC: 4.6 MMOL/L (ref 3.5–5.1)
PROT SERPL-MCNC: 6.3 G/DL (ref 5.7–8.2)
RBC # BLD AUTO: 5.18 X10(6)UL
RBC UR QL AUTO: NEGATIVE
SODIUM SERPL-SCNC: 137 MMOL/L (ref 136–145)
SP GR UR STRIP.AUTO: 1.01 (ref 1–1.03)
T3FREE SERPL-MCNC: 3.16 PG/ML (ref 2.4–4.2)
T4 FREE SERPL-MCNC: 1.5 NG/DL (ref 0.8–1.7)
TSI SER-ACNC: 0.12 MIU/ML (ref 0.55–4.78)
UROBILINOGEN UR STRIP.AUTO-MCNC: NORMAL MG/DL
WBC # BLD AUTO: 5.6 X10(3) UL (ref 4–11)

## 2024-10-23 PROCEDURE — 99205 OFFICE O/P NEW HI 60 MIN: CPT | Performed by: OTHER

## 2024-10-23 PROCEDURE — 70450 CT HEAD/BRAIN W/O DYE: CPT | Performed by: EMERGENCY MEDICINE

## 2024-10-23 PROCEDURE — 71045 X-RAY EXAM CHEST 1 VIEW: CPT | Performed by: EMERGENCY MEDICINE

## 2024-10-23 PROCEDURE — 70553 MRI BRAIN STEM W/O & W/DYE: CPT | Performed by: OTHER

## 2024-10-23 RX ORDER — ENOXAPARIN SODIUM 100 MG/ML
30 INJECTION SUBCUTANEOUS DAILY
Status: DISCONTINUED | OUTPATIENT
Start: 2024-10-23 | End: 2024-10-25

## 2024-10-23 RX ORDER — PANTOPRAZOLE SODIUM 20 MG/1
20 TABLET, DELAYED RELEASE ORAL DAILY
Status: DISCONTINUED | OUTPATIENT
Start: 2024-10-23 | End: 2024-10-25

## 2024-10-23 RX ORDER — GADOTERATE MEGLUMINE 376.9 MG/ML
15 INJECTION INTRAVENOUS
Status: COMPLETED | OUTPATIENT
Start: 2024-10-23 | End: 2024-10-23

## 2024-10-23 RX ORDER — ASPIRIN 81 MG/1
81 TABLET, CHEWABLE ORAL EVERY MORNING
Status: DISCONTINUED | OUTPATIENT
Start: 2024-10-23 | End: 2024-10-25

## 2024-10-23 RX ORDER — LEVOTHYROXINE SODIUM 75 UG/1
75 TABLET ORAL
Status: DISCONTINUED | OUTPATIENT
Start: 2024-10-23 | End: 2024-10-25

## 2024-10-23 RX ORDER — METOCLOPRAMIDE HYDROCHLORIDE 5 MG/ML
5 INJECTION INTRAMUSCULAR; INTRAVENOUS EVERY 8 HOURS PRN
Status: DISCONTINUED | OUTPATIENT
Start: 2024-10-23 | End: 2024-10-25

## 2024-10-23 RX ORDER — EZETIMIBE 10 MG/1
10 TABLET ORAL NIGHTLY
Status: DISCONTINUED | OUTPATIENT
Start: 2024-10-23 | End: 2024-10-25

## 2024-10-23 RX ORDER — ONDANSETRON 2 MG/ML
4 INJECTION INTRAMUSCULAR; INTRAVENOUS EVERY 6 HOURS PRN
Status: DISCONTINUED | OUTPATIENT
Start: 2024-10-23 | End: 2024-10-25

## 2024-10-23 RX ORDER — ATORVASTATIN CALCIUM 20 MG/1
20 TABLET, FILM COATED ORAL NIGHTLY
Status: DISCONTINUED | OUTPATIENT
Start: 2024-10-23 | End: 2024-10-25

## 2024-10-23 RX ORDER — ACETAMINOPHEN 500 MG
500 TABLET ORAL EVERY 4 HOURS PRN
Status: DISCONTINUED | OUTPATIENT
Start: 2024-10-23 | End: 2024-10-25

## 2024-10-23 NOTE — ED PROVIDER NOTES
Patient Seen in: [facility] Emergency Department      History     Chief Complaint   Patient presents with    Seizures     Stated Complaint:     Subjective:   HPI      89-year-old male comes to the hospital for an evaluation for possible seizure.  It was reported by the wife that he for about 5 minutes or so was just staring off and became incontinent of urine.  He cannot remember that event.  This happened to him 1 time years ago but he cannot remember any other evaluation for such.  He is feeling fine at this time.  He denies any headaches or dizziness.  No chest pain or troubles breathing.  Denies any abdominal pains.  Has no nausea or vomiting.  He denies any fevers, chills or cough.  No other complaints at this time    Objective:     Past Medical History:    Atherosclerosis of coronary artery    Cataract    Coronary atherosclerosis of unspecified type of vessel, native or graft    Esophageal reflux    GERD    Heart attack (HCC)    High blood pressure    High cholesterol    HYPERLIPIDEMIA    HYPERTENSION    HYPOTHYROIDISM    Osteoarthritis    OSTEOPENIA    SINUSITIS    Stroke (HCC)    tia    Subdural hematoma (HCC)    Unspecified disorder of thyroid    Unspecified essential hypertension    Visual impairment              Past Surgical History:   Procedure Laterality Date    Angioplasty (coronary)  1994 x 3 procedures    no stents    Carotid endarterectomy Right     Cataract Bilateral 2012    lens implants    Colonoscopy  11/04    Colonoscopy N/A 10/27/2014    Procedure: COLONOSCOPY;  Surgeon: Chilo Bruce MD;  Location:  ENDOSCOPY    Other      trigger finger release x 2 procedures    Skin surgery  02/26/2018    Exc - atyp nevus (mod-severe dysplasia) - R paramedian lower mid back    Tonsillectomy                  Social History     Socioeconomic History    Marital status:     Number of children: 3   Occupational History    Occupation: Retired   Tobacco Use    Smoking status: Former     Current  packs/day: 0.00     Average packs/day: 1 pack/day for 40.0 years (40.0 ttl pk-yrs)     Types: Cigarettes     Start date: 6/15/1946     Quit date: 6/15/1986     Years since quittin.3    Smokeless tobacco: Never   Vaping Use    Vaping status: Never Used   Substance and Sexual Activity    Alcohol use: Yes     Alcohol/week: 0.0 standard drinks of alcohol     Comment: rare    Drug use: No    Sexual activity: Yes     Partners: Female   Other Topics Concern    Seat Belt Yes     Social Drivers of Health     Financial Resource Strain: Low Risk  (10/1/2024)    Received from [facility]    Overall Financial Resource Strain (CARDIA)     Difficulty of Paying Living Expenses: Not hard at all   Food Insecurity: No Food Insecurity (10/1/2024)    Received from [facility] - Food Insecurity     Worried About Running Out of Food in the Last Year: No     Ran Out of Food in the Last Year: No   Transportation Needs: No Transportation Needs (10/1/2024)    Received from [facility] - Transportation     Lack of Transportation: No   Physical Activity: Inactive (10/1/2024)    Received from [facility]    Exercise Vital Sign     Days of Exercise per Week: 0 days     Minutes of Exercise per Session: 30 min   Stress: No Stress Concern Present (10/1/2024)    Received from [facility]    [employer] - Occupational Stress Questionnaire     Feeling of Stress : Not at all   Social Connections: Moderately Integrated (10/1/2024)    Received from [facility]    Social Connection and Isolation Panel [NHANES]     Frequency of Communication with Friends and Family: Three times a week     Frequency of Social Gatherings with Friends and Family: Twice a week     Attends Tenriism Services: 1 to 4 times per year     Active Member of Clubs or Organizations: No     Attends Club or Organization Meetings: Never     Marital Status:    Housing Stability:  Not At Risk (10/1/2024)    [facility]     Has Housing: Yes     Worried About Losing Housing: No     Unable to Get Utilities: No                  Physical Exam     ED Triage Vitals   BP 10/23/24 1044 129/50   Pulse 10/23/24 1044 72   Resp 10/23/24 1044 18   Temp 10/23/24 1055 97.6 °F (36.4 °C)   Temp src 10/23/24 1055 Oral   SpO2 10/23/24 1044 100 %   O2 Device 10/23/24 1245 None (Room air)       Current Vitals:   Vital Signs  BP: 127/63  Pulse: 71  Resp: 24  Temp: 97.6 °F (36.4 °C)  Temp src: Oral  MAP (mmHg): 81    Oxygen Therapy  SpO2: 97 %  O2 Device: None (Room air)        Physical Exam  HEENT : NCAT, EOMI, PEERL,  neck supple, no JVD, trachea midline, No LAD  Heart: S1S2 normal. No murmurs, regular rate and rhythm  Lungs: Clear to auscultation bilaterally  Abdomen: Soft nontender nondistended normal active bowel sounds without rebound, guarding or masses noted  Back nontender without CVA tenderness  Extremity no clubbing, cyanosis or edema noted.  Full range of motion noted without tenderness  Neuro: No focal deficits noted    All measures to prevent infection transmission during my interaction with the patient were taken.  The patient was already wearing droplet mask on my arrival to the room.  Personal protective equipment including a droplet mask as well as gloves were worn throughout the duration of my exam.  Hand washing was performed prior to and after the exam.  Stethoscope and equipment used during my examination was cleaned with a super Sani cloth germicidal wipe following the exam.    ED Course     Labs Reviewed   COMP METABOLIC PANEL (14) - Abnormal; Notable for the following components:       Result Value    Glucose 169 (*)     Creatinine 1.32 (*)     eGFR-Cr 51 (*)     AST 38 (*)     All other components within normal limits   CBC WITH DIFFERENTIAL WITH PLATELET - Abnormal; Notable for the following components:    HGB 12.1 (*)     HCT 37.2 (*)      MCV 71.8 (*)     MCH 23.4 (*)     All other components within normal limits   URINALYSIS, ROUTINE - Abnormal; Notable for the following components:    Protein Urine Trace (*)     All other components within normal limits   TSH W REFLEX TO FREE T4 - Abnormal; Notable for the following components:    TSH 0.125 (*)     All other components within normal limits   POCT GLUCOSE - Abnormal; Notable for the following components:    POC Glucose 161 (*)     All other components within normal limits   T4, FREE (S) - Normal   FREE T3 (TRIIODOTHYRONINE) - Normal   RAINBOW DRAW LAVENDER   RAINBOW DRAW LIGHT GREEN   RAINBOW DRAW BLUE     EKG    Rate, intervals and axes as noted on EKG Report.  Rate: 67  Rhythm: Sinus Rhythm  Reading: , QRS of 84, patient is normal sinus rhythm noted this time without ischemic change.      ED Course as of 10/23/24 1314  ------------------------------------------------------------  Time: 10/23 1313  Comment: While here the patient's urinalysis was negative.  His TSH was low with a normal free T3 and T4.  His CMP was relatively unremarkable with creatinine 1.32.  The patient's white count is 5.6 with a hemoglobin of 12.1.  The patient had a chest x-ray that I personally interpreted showing no acute cardiopulmonary process.  Read the radiology port as well.  Patient in the CT brain that was normal.  He remained awake and alert while here.  I spoke with the hospitalist as well as consulted neurology for his syncope versus seizure       CT BRAIN OR HEAD (CPT=70450)    Result Date: 10/23/2024  PROCEDURE:  CT BRAIN OR HEAD (06700)  COMPARISON:  [facility] , CT, CT BRAIN OR HEAD (71374), 11/30/2023, 4:18 PM.  INDICATIONS:  sz  TECHNIQUE:  Noncontrast CT scanning is performed through the brain. Dose reduction techniques were used. Dose information is transmitted to the ACR (American College of Radiology) NRDR (National Radiology Data Registry) which includes the Dose Index Registry.  PATIENT STATED  HISTORY: (As transcribed by Technologist)  Patient states fatigue and neck discomfort.    FINDINGS:  VENTRICLES/SULCI:  Diffuse cerebral and cerebellar atrophy. INTRACRANIAL:  No acute intracranial hemorrhage or midline shift.  No mass effect.  There is patchy decreased attenuation in the periventricular white matter both cerebral hemispheres which is nonspecific although compatible with chronic small vessel ischemic changes of aging. SINUSES:           Mucosal thickening ethmoid air cells.  MASTOIDS:          No sign of acute inflammation. SKULL:             No depressed calvarial fracture.  Postsurgical changes of right parietal calvarial urbano hole.            CONCLUSION:  No acute intracranial hemorrhage or depressed calvarial fracture.  There is diffuse cerebral and cerebellar atrophy with chronic microvascular ischemic changes of aging.  LOCATION:  [id number]   Dictated by (CST): Susie Lyle MD on 10/23/2024 at 12:44 PM     Finalized by (CST): Susie Lyle MD on 10/23/2024 at 12:46 PM       XR CHEST AP PORTABLE  (CPT=71045)    Result Date: 10/23/2024  PROCEDURE:  XR CHEST AP PORTABLE  (CPT=71045)  TECHNIQUE:  AP chest radiograph was obtained.  COMPARISON:  [facility] , XR, XR CHEST AP PORTABLE  (CPT=71045), 11/30/2023, 3:59 PM.  INDICATIONS:  sz seizure  PATIENT STATED HISTORY: (As transcribed by Technologist)  Patient offered no additional history at this time.    FINDINGS:  Tortuous aorta.  The heart is borderline normal in size.  Bronchial wall thickening is present.  No signs of bronchial dilation or obvious bronchiectasis.  No focal consolidation is seen.  The costophrenic angles are sharp.  No sign of pneumothorax.            CONCLUSION:  Nonspecific bronchial wall thickening, but consider bronchitis.    LOCATION:  [facility]      Dictated by (CST): Chilo Oakes MD on 10/23/2024 at 12:00 PM     Finalized by (CST): Chilo Oakes MD on 10/23/2024 at 12:01 PM        Medications - No data to display         MDM       Differential diagnosis included syncope, seizure, electrolyte imbalance, hypoglycemia but not limited such.  At this time the patient will be admitted for his syncopal event.  Seizure still in consideration and neurology's been consulted.      This note was prepared using Dragon Medical voice recognition dictation software.  As a result errors may occur.  When identified to these areas have been corrected.  While every attempt is made to correct errors during dictation discrepancies may still exist.  Please contact if there are any errors.    Admission disposition: 10/23/2024  1:14 PM           Medical Decision Making      Disposition and Plan     Clinical Impression:  1. Syncope, unspecified syncope type         Disposition:  Admit  10/23/2024  1:14 pm    Follow-up:  No follow-up provider specified.        Medications Prescribed:  Current Discharge Medication List              Supplementary Documentation:         Hospital Problems       Present on Admission  Date Reviewed: 4/7/2022            ICD-10-CM Noted POA    * (Principal) Syncope, unspecified syncope type R55 11/30/2023 Unknown

## 2024-10-23 NOTE — PLAN OF CARE
NURSING ADMISSION NOTE      Patient admitted via Cart  Oriented to room.  Safety precautions initiated.  Bed in low position.  Call light in reach.    Navigator complete  A/Ox4, RA  Q shift orthostatics  Reg diet  Tele, NSR  Denies n/v & pain   Pending MRI brain & EEG

## 2024-10-23 NOTE — ED QUICK NOTES
Orders for admission, patient is aware of plan and ready to go upstairs. Any questions, please call ED RN Marita at extension [phone].     Patient Covid vaccination status: Fully vaccinated     COVID Test Ordered in ED: None    COVID Suspicion at Admission: N/A    Running Infusions:  None    Mental Status/LOC at time of transport: awake, alert and oriented x 4    Other pertinent information: pleasant, calm and cooperative. Family at bedside. Normally ambulatory without assistive devices at baseline. Has not yet been ambulatory in ED.  CIWA score: N/A   NIH score:  N/A

## 2024-10-23 NOTE — PROGRESS NOTES
10/23/24 1448 10/23/24 1450 10/23/24 1452   Vitals   /64 144/58 132/54   MAP (mmHg) 88 84 78   BP Location Right arm Right arm Right arm   BP Method Automatic Automatic Automatic   Patient Position Lying Sitting Standing

## 2024-10-23 NOTE — CONSULTS
[facility]   NEUROLOGY   CONSULT NOTE    Admission date: 10/23/2024  Reason for Consult: Staring spell  Chief Complaint:   Chief Complaint   Patient presents with    Seizures   ________________________________________________________________    History     History of Presenting Illness  89 year old male with PMH of CAD, GERD, Htn, HLD, prior SDH 2019 who presents with a 5-minute unresponsive spell witnessed by his wife.  Also noted to become incontinent of urine during this episode.      History obtained from patient, daughter and wife at bedside. Patient was sitting in his chair watching TV. Does not recall anything, no preceding symptoms. Wife heard him moaning so called out to him with no response despite calling him name multiple times. Found still in chair, eyes closed. EMS called and patient now opening eyes to noxious stim intermittently. Felt tired. No obvious shaking noted. Was told by EMS that his BP was \"very low\". Now back to baseline.  Had similar episode in the past which was thought to be 2/2 hypotension and he had his BB dose reduced at that time. SDH in 2019 with EEG at that time showing right sided sharps.     Past Medical History:    Atherosclerosis of coronary artery    Cataract    Coronary atherosclerosis of unspecified type of vessel, native or graft    Esophageal reflux    GERD    Heart attack (HCC)    High blood pressure    High cholesterol    HYPERLIPIDEMIA    HYPERTENSION    HYPOTHYROIDISM    Osteoarthritis    OSTEOPENIA    SINUSITIS    Stroke (HCC)    tia    Subdural hematoma (HCC)    Unspecified disorder of thyroid    Unspecified essential hypertension    Visual impairment     Past Surgical History:   Procedure Laterality Date    Angioplasty (coronary)  1994 x 3 procedures    no stents    Carotid endarterectomy Right     Cataract Bilateral 2012    lens implants    Colonoscopy  11/04    Colonoscopy N/A 10/27/2014    Procedure: COLONOSCOPY;  Surgeon: Chilo Bruce MD;   Location: [facility] ENDOSCOPY    Other      trigger finger release x 2 procedures    Skin surgery  2018    Exc - atyp nevus (mod-severe dysplasia) - R paramedian lower mid back    Tonsillectomy       Social History     Socioeconomic History    Marital status:     Number of children: 3   Occupational History    Occupation: Retired   Tobacco Use    Smoking status: Former     Current packs/day: 0.00     Average packs/day: 1 pack/day for 40.0 years (40.0 ttl pk-yrs)     Types: Cigarettes     Start date: 6/15/1946     Quit date: 6/15/1986     Years since quittin.3    Smokeless tobacco: Never   Vaping Use    Vaping status: Never Used   Substance and Sexual Activity    Alcohol use: Yes     Alcohol/week: 0.0 standard drinks of alcohol     Comment: rare    Drug use: No    Sexual activity: Yes     Partners: Female   Other Topics Concern    Seat Belt Yes     Social Drivers of Health     Financial Resource Strain: Low Risk  (10/1/2024)    Received from [facility]    Overall Financial Resource Strain (CARDIA)     Difficulty of Paying Living Expenses: Not hard at all   Food Insecurity: No Food Insecurity (10/1/2024)    [facility]     Worried About Running Out of Food in the Last Year: No     Ran Out of Food in the Last Year: No   Transportation Needs: No Transportation Needs (10/1/2024)    Received from [facility] - Transportation     Lack of Transportation: No   Physical Activity: Inactive (10/1/2024)    Received from [facility]    Exercise Vital Sign     Days of Exercise per Week: 0 days     Minutes of Exercise per Session: 30 min   Stress: No Stress Concern Present (10/1/2024)    Received from [facility]    [employer] - Occupational Stress Questionnaire     Feeling of Stress : Not at all   Social Connections: Moderately Integrated (10/1/2024)    Received from [facility]    Social  Connection and Isolation Panel [NHANES]     Frequency of Communication with Friends and Family: Three times a week     Frequency of Social Gatherings with Friends and Family: Twice a week     Attends [employer] Services: 1 to 4 times per year     Active Member of Clubs or Organizations: No     Attends Club or Organization Meetings: Never     Marital Status:    Housing Stability: Not At Risk (10/1/2024)    Received from [facility] - Housing/Utilities     Has Housing: Yes     Worried About Losing Housing: No     Unable to Get Utilities: No     Family History   Problem Relation Age of Onset    Heart Disorder Father     Other (Other) Brother      Allergies Allergies[1]    Home Meds  Current Outpatient Medications   Medication Instructions    amLODIPine (NORVASC) 5 mg, Oral, Daily    aspirin 81 mg, Every morning    ezetimibe (ZETIA) 10 mg, Nightly    FOLIC ACID OR 1 tablet, Every morning    levothyroxine (SYNTHROID) 75 mcg, Oral, Daily    lisinopril (PRINIVIL; ZESTRIL) 40 mg, Daily    Multiple Vitamins-Minerals (TAB-A-MARCELLE MAXIMUM) Oral Tab 1 tablet, Daily    pantoprazole (PROTONIX) 20 mg, Daily    simvastatin (ZOCOR) 40 mg, Nightly     Scheduled Meds:   enoxaparin  30 mg Subcutaneous Daily     Continuous Infusions:  PRN Meds:  acetaminophen    ondansetron    metoclopramide    OBJECTIVE   VITAL SIGNS:   Temp:  [97.6 °F (36.4 °C)] 97.6 °F (36.4 °C)  Pulse:  [63-73] 73  Resp:  [18-24] 18  BP: (127-130)/(50-63) 129/60  SpO2:  [97 %-100 %] 98 %    INTAKE/OUTPUT:  No intake or output data in the 24 hours ending 10/23/24 1442    PHYSICAL EXAM:  CONSTITUTIONAL: Patient resting comfortably in bed, NAD    NEUROLOGIC:    Mental Status:  A&O x 4, Follows simple commands, no obvious aphasia or dysarthria  Cranial nerves: PERRL.  Visual fields full.  EOMI.  Face symmetric with normal movement bilaterally.  Hearing grossly intact. Tongue midline with normal movements.   Motor: Drift:  Absent; Motor exam is 5  out of 5 in all extremities bilaterally  Sensation: Intact to light touch bilaterally  Cerebellar: Normal Finger-To-Nose test    LABORATORY DATA:  Last 24 hour labs were reviewed in detail.  Recent Labs   Lab 10/23/24  1046      K 4.6      CO2 30.0   *   BUN 17   CREATSERUM 1.32*     Recent Labs   Lab 10/23/24  1046   WBC 5.6   HGB 12.1*   .0     Recent Labs   Lab 10/23/24  1046   ALT 24   AST 38*     No results for input(s): \"MG\", \"PHOS\" in the last 168 hours.    Radiology:    CT BRAIN OR HEAD (CPT=70450)    Result Date: 10/23/2024  CONCLUSION:  No acute intracranial hemorrhage or depressed calvarial fracture.  There is diffuse cerebral and cerebellar atrophy with chronic microvascular ischemic changes of aging.  LOCATION:  [id number]   Dictated by (CST): Susie Lyle MD on 10/23/2024 at 12:44 PM     Finalized by (CST): Susie Lyle MD on 10/23/2024 at 12:46 PM       XR CHEST AP PORTABLE  (CPT=71045)    Result Date: 10/23/2024  CONCLUSION:  Nonspecific bronchial wall thickening, but consider bronchitis.    LOCATION:  [facility]      Dictated by (CST): Chilo Oakes MD on 10/23/2024 at 12:00 PM     Finalized by (CST): Chilo Oakes MD on 10/23/2024 at 12:01 PM      ASSESSMENT/PLAN   89 year old male with PMH of CAD, GERD, Htn, HLD, prior TIA who presents with a 5-minute staring spell witnessed by his wife.    Principal Problem:    Syncope, unspecified syncope type     Reviewed notes by IM   Reviewed CBC/BMP and CT head   Imaging personally reviewed and interpreted by me, agree with radiologist impression.  Discussed case with care team    Unresponsive episode  - CT head reassuring   - BP reportedly low and prior admission with hypotension makes cardiac etiology possible however patient has hx of SDH and prior EEG with right sided sharps so seizure remains in ddx but description of event with eyes closed would not be typical for seizure   - rEEG pending  - Check orthostatics   - BP meds per  primary  - MRI brain to eval for underlying structural abnormalities     Graham Hernández MD  Neurocritical Care  [facility]    Disclaimer: This record was dictated using Dragon software. There may be errors due to voice recognition problems that were not realized and corrected during the completion of the note.         [1]   Allergies  Allergen Reactions    Hydrochlorothiazide RASH     Skin rash with thiazide diuretic    Morphine [Morphine Sulfate In Dextrose] OTHER (SEE COMMENTS)     hypotension    Sulphadimidine Sodium [Sulfamethazine Sodium] OTHER (SEE COMMENTS)     Unsure reaction

## 2024-10-23 NOTE — ED INITIAL ASSESSMENT (HPI)
Patient had an episode witnessed by his wife during which he became unresponsive to verbal stimuli and was incontinent of urine. On EMS arrival, patient was alert and oriented x 4. On arrival to ED, patient states he feels okay, just tired. Patient is alert and oriented, moving all extremities equally. Only complaint is the fatigue and a feeling of fullness in the back of his neck.

## 2024-10-23 NOTE — H&P
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    Pt doesn't remember the event, can't recall what happened. Family notes, that pt woke up took his synthroid, ate breakfast and went to sit down and was about to take his meds when he all of a sudden passed out while sitting.  Wife couldn't wake him up she called her neighbor and the paramedics.  Per family he was out for about 5 min.  Pt doesn't recall having chest pain no SOB no abdominal pain prior or after event.  No changes in his meds recently, no fevers no chills no dysuria, no diarrhea noted.    Had 1 other episodes like this in the past   12/2023: BB stopped stress negative for ischemia, 14 day zio patch: second degree type 1 av block             Allergies[1]     Past Medical History:    Atherosclerosis of coronary artery    Cataract    Coronary atherosclerosis of unspecified type of vessel, native or graft    Esophageal reflux    GERD    Heart attack (HCC)    High blood pressure    High cholesterol    HYPERLIPIDEMIA    HYPERTENSION    HYPOTHYROIDISM    Osteoarthritis    OSTEOPENIA    SINUSITIS    Stroke (HCC)    tia    Subdural hematoma (HCC)    Unspecified disorder of thyroid    Unspecified essential hypertension    Visual impairment      Past Surgical History:   Procedure Laterality Date    Angioplasty (coronary)  1994 x 3 procedures    no stents    Carotid endarterectomy Right     Cataract Bilateral 2012    lens implants    Colonoscopy  11/04    Colonoscopy N/A 10/27/2014    Procedure: COLONOSCOPY;  Surgeon: Chilo Bruce MD;  Location:  ENDOSCOPY    Other      trigger finger release x 2 procedures    Skin surgery  02/26/2018    Exc - atyp nevus (mod-severe dysplasia) - R paramedian lower  mid back    Tonsillectomy        Social History     Tobacco Use    Smoking status: Former     Current packs/day: 0.00     Average packs/day: 1 pack/day for 40.0 years (40.0 ttl pk-yrs)     Types: Cigarettes     Start date: 6/15/1946     Quit date: 6/15/1986     Years since quittin.3    Smokeless tobacco: Never   Substance Use Topics    Alcohol use: Yes     Alcohol/week: 0.0 standard drinks of alcohol     Comment: rare      Family History   Problem Relation Age of Onset    Heart Disorder Father     Other (Other) Brother           Intake/Output:  No intake/output data recorded.  Wt Readings from Last 3 Encounters:   10/23/24 138 lb (62.6 kg)   23 136 lb 14.5 oz (62.1 kg)   10/23/23 140 lb (63.5 kg)         Exam:     Temp:  [97.6 °F (36.4 °C)] 97.6 °F (36.4 °C)  Pulse:  [63-72] 71  Resp:  [18-24] 24  BP: (127-129)/(50-63) 127/63  SpO2:  [97 %-100 %] 97 %  General:  Alert, no distress, appears stated age.   Head:  Normocephalic, without obvious abnormality, atraumatic.    Eyes:  Sclera anicteric, No conjunctival pallor, EOMs intact.    Throat: MMM     Neck: Supple, symmetrical, trachea midline    Lungs:   Clear to auscultation bilaterally. Normal effort    Chest wall:  No tenderness or deformity.   Heart:  Regular rate and rhythm, no peripheral edema    Abdomen:   Soft, NT/ND, +bs    MSK: Atraumatic, no cyanosis or edema.    Skin: No visible rashes or lesions.     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     Plan:    Syncope  - monitor on tele   - check orthostatic BP  - check for infection, UA  - given hx of TIA, hx of SDH neuro consulted   - in the past thought to be 2/2 low HR, BB was stopped, cards to see      HLD  - statin    HTN  - pt ace and amlodipine  - check orthostatics and restart meds as able    GERD  - ppi     Hypothyroidism   - synthroid     TIA  - home meds    Hx of R CEA  Hx of SDH  - US at duly 2023 stable findings  - CTOH in ED no  acute issues noted      Prophylaxis:  DVT:  lovenox    Full code d/w pt and pts family.       Lisa Siddiqui MD   [facility] Hospitalist            [1]   Allergies  Allergen Reactions    Hydrochlorothiazide RASH     Skin rash with thiazide diuretic    Morphine [Morphine Sulfate In Dextrose] OTHER (SEE COMMENTS)     hypotension    Sulphadimidine Sodium [Sulfamethazine Sodium] OTHER (SEE COMMENTS)     Unsure reaction

## 2024-10-23 NOTE — PLAN OF CARE
Problem: NEUROLOGICAL - ADULT  Goal: Achieves stable or improved neurological status  Description: INTERVENTIONS  - Assess for and report changes in neurological status  - Initiate measures to prevent increased intracranial pressure  - Maintain blood pressure and fluid volume within ordered parameters to optimize cerebral perfusion and minimize risk of hemorrhage  - Monitor temperature, glucose, and sodium. Initiate appropriate interventions as ordered  Outcome: Progressing  Goal: Absence of seizures  Description: INTERVENTIONS  - Monitor for seizure activity  - Administer anti-seizure medications as ordered  - Monitor neurological status  Outcome: Progressing  Goal: Remains free of injury related to seizure activity  Description: INTERVENTIONS:  - Maintain airway, patient safety  and administer oxygen as ordered  - Monitor patient for seizure activity, document and report duration and description of seizure to MD/LIP  - If seizure occurs, turn patient to side and suction secretions as needed  - Reorient patient post seizure  - Seizure pads on all 4 side rails  - Instruct patient/family to notify RN of any seizure activity  - Instruct patient/family to call for assistance with activity based on assessment  Outcome: Progressing  Goal: Achieves maximal functionality and self care  Description: INTERVENTIONS  - Monitor swallowing and airway patency with patient fatigue and changes in neurological status  - Encourage and assist patient to increase activity and self care with guidance from PT/OT  - Encourage visually impaired, hearing impaired and aphasic patients to use assistive/communication devices  Outcome: Progressing     Problem: SAFETY ADULT - FALL  Goal: Free from fall injury  Description: INTERVENTIONS:  - Assess pt frequently for physical needs  - Identify cognitive and physical deficits and behaviors that affect risk of falls.  - [place] fall precautions as indicated by assessment.  - Educate pt/family on  patient safety including physical limitations  - Instruct pt to call for assistance with activity based on assessment  - Modify environment to reduce risk of injury  - Provide assistive devices as appropriate  - Consider OT/PT consult to assist with strengthening/mobility  - Encourage toileting schedule  Outcome: Progressing     Problem: Patient/Family Goals  Goal: Patient/Family Long Term Goal  Description: Patient's Long Term Goal: dc home    Interventions:  - eeg  -pt/ot eval  -mri brain  - See additional Care Plan goals for specific interventions  Outcome: Progressing  Goal: Patient/Family Short Term Goal  Description: Patient's Short Term Goal:     Interventions:   -   - See additional Care Plan goals for specific interventions  Outcome: Progressing

## 2024-10-24 LAB
ANION GAP SERPL CALC-SCNC: 3 MMOL/L (ref 0–18)
ATRIAL RATE: 67 BPM
BASOPHILS # BLD AUTO: 0.08 X10(3) UL (ref 0–0.2)
BASOPHILS NFR BLD AUTO: 1.2 %
BUN BLD-MCNC: 13 MG/DL (ref 9–23)
CALCIUM BLD-MCNC: 10 MG/DL (ref 8.7–10.4)
CHLORIDE SERPL-SCNC: 107 MMOL/L (ref 98–112)
CO2 SERPL-SCNC: 29 MMOL/L (ref 21–32)
CREAT BLD-MCNC: 1.18 MG/DL
EGFRCR SERPLBLD CKD-EPI 2021: 58 ML/MIN/1.73M2 (ref 60–?)
EOSINOPHIL # BLD AUTO: 0.4 X10(3) UL (ref 0–0.7)
EOSINOPHIL NFR BLD AUTO: 6.1 %
ERYTHROCYTE [DISTWIDTH] IN BLOOD BY AUTOMATED COUNT: 14.8 %
GLUCOSE BLD-MCNC: 87 MG/DL (ref 70–99)
HCT VFR BLD AUTO: 36.9 %
HGB BLD-MCNC: 11.6 G/DL
IMM GRANULOCYTES # BLD AUTO: 0.02 X10(3) UL (ref 0–1)
IMM GRANULOCYTES NFR BLD: 0.3 %
LYMPHOCYTES # BLD AUTO: 1.43 X10(3) UL (ref 1–4)
LYMPHOCYTES NFR BLD AUTO: 21.9 %
MCH RBC QN AUTO: 23.1 PG (ref 26–34)
MCHC RBC AUTO-ENTMCNC: 31.4 G/DL (ref 31–37)
MCV RBC AUTO: 73.5 FL
MONOCYTES # BLD AUTO: 0.69 X10(3) UL (ref 0.1–1)
MONOCYTES NFR BLD AUTO: 10.6 %
NEUTROPHILS # BLD AUTO: 3.91 X10 (3) UL (ref 1.5–7.7)
NEUTROPHILS # BLD AUTO: 3.91 X10(3) UL (ref 1.5–7.7)
NEUTROPHILS NFR BLD AUTO: 59.9 %
OSMOLALITY SERPL CALC.SUM OF ELEC: 287 MOSM/KG (ref 275–295)
P AXIS: 63 DEGREES
P-R INTERVAL: 234 MS
PLATELET # BLD AUTO: 178 10(3)UL (ref 150–450)
POTASSIUM SERPL-SCNC: 4.2 MMOL/L (ref 3.5–5.1)
Q-T INTERVAL: 388 MS
QRS DURATION: 84 MS
QTC CALCULATION (BEZET): 409 MS
R AXIS: -4 DEGREES
RBC # BLD AUTO: 5.02 X10(6)UL
SODIUM SERPL-SCNC: 139 MMOL/L (ref 136–145)
T AXIS: 63 DEGREES
VENTRICULAR RATE: 67 BPM
WBC # BLD AUTO: 6.5 X10(3) UL (ref 4–11)

## 2024-10-24 PROCEDURE — 99232 SBSQ HOSP IP/OBS MODERATE 35: CPT

## 2024-10-24 NOTE — PHYSICAL THERAPY NOTE
PHYSICAL THERAPY EVALUATION - INPATIENT     Room Number: 3613/3613-A  Evaluation Date: 10/24/2024  Type of Evaluation: Initial  Physician Order: PT Eval and Treat    Presenting Problem: unresponsive episode, syncope  Co-Morbidities : GERD, HTN, hypothyroidism, TIA, R CEA, SDH 2019  Reason for Therapy: Mobility Dysfunction and Discharge Planning    PHYSICAL THERAPY ASSESSMENT   Patient is a 89 year old male admitted 10/23/2024 for unresponsive episode, syncope.   Patient is currently functioning at baseline with bed mobility, transfers, and gait. Prior to admission, patient's baseline is independent.     Given the patient is functioning near baseline level do not anticipate skilled therapy needs at discharge .    PLAN  Patient has been evaluated and presents with no skilled Physical Therapy needs at this time.  Patient discharged from Physical Therapy services.  Please re-order if a new functional limitation presents during this admission.         GOALS  Patient was able to achieve the following goals ...    Patient was able to transfer At previous, functional level   Patient able to ambulate on level surfaces At previous, functional level     HOME SITUATION  Type of Home: House  Home Layout: One level  Stairs to Enter : 1                  Lives With: Spouse              Prior Level of [place]: Pt typically indep with ADLs and mobility.     SUBJECTIVE  \"I'm doing okay\"     OBJECTIVE  Precautions: None  Fall Risk: Standard fall risk    WEIGHT BEARING RESTRICTION     PAIN ASSESSMENT  Ratin          COGNITION  Overall Cognitive Status:  WFL - within functional limits    RANGE OF MOTION AND STRENGTH ASSESSMENT  See OT note for UE assessment      Lower extremity ROM is within functional limits      Lower extremity strength is within functional limits     BALANCE  Static Sitting: Good  Dynamic Sitting: Good  Static Standing: Fair +  Dynamic Standing: Fair +    ADDITIONAL TESTS  Additional Tests: Modified  [place]              Modified [place]: 1                  ACTIVITY TOLERANCE                         O2 WALK       NEUROLOGICAL FINDINGS                        AM-PAC '6-Clicks' INPATIENT SHORT FORM - BASIC MOBILITY  How much difficulty does the patient currently have...  Patient Difficulty: Turning over in bed (including adjusting bedclothes, sheets and blankets)?: None   Patient Difficulty: Sitting down on and standing up from a chair with arms (e.g., wheelchair, bedside commode, etc.): None   Patient Difficulty: Moving from lying on back to sitting on the side of the bed?: None   How much help from another person does the patient currently need...   Help from Another: Moving to and from a bed to a chair (including a wheelchair)?: None   Help from Another: Need to walk in hospital room?: None   Help from Another: Climbing 3-5 steps with a railing?: None       AM-PAC Score:  Raw Score: 24   Approx Degree of Impairment: 0%   Standardized Score (AM-PAC Scale): 61.14   CMS Modifier (G-Code): CH    FUNCTIONAL ABILITY STATUS  Gait Assessment   Functional Mobility/Gait Assessment  Gait Assistance: Independent;Supervision  Distance (ft): 300  Assistive Device: None  Pattern: Within Functional Limits    Skilled Therapy Provided     Bed Mobility:  Rolling: NT  Supine to sit: ind   Sit to supine: NT     Transfer Mobility:  Sit to stand: ind   Stand to sit: ind  Gait = supervision progressing to ind    Therapist's comments: RN cleared for session. Pt agreeable for therapy, received supine. Pt educated on resting between position changes to ensure no dizziness. Instructed to call for nursing staff for any needs and OOB mobility.     Exercise/Education Provided:  Bed mobility  Body mechanics  Energy conservation  Functional activity tolerated  Gait training  Posture  Strengthening  Transfer training    Patient End of Session: Up in chair;Needs met;Call light within reach;RN aware of session/findings;All patient questions and  concerns addressed;Hospital anti-slip socks;Discussed recommendations with /    Patient Evaluation Complexity Level:  History High - 3 or more personal factors and/or co-morbidities   Examination of body systems Low -  addressing 1-2 elements   Clinical Presentation Low- Stable   Clinical Decision Making Low Complexity       PT Session Time: 20 minutes  Gait Training: 10 minutes  Therapeutic Activity: 5 minutes

## 2024-10-24 NOTE — PROGRESS NOTES
CC: follow-up hospital admission syncope    SUBJECTIVE:  Interval History:     In bed feels well  No nv  No cp  Would like to g ohome    OBJECTIVE:  Scheduled Meds:    enoxaparin  30 mg Subcutaneous Daily    aspirin  81 mg Oral QAM    ezetimibe  10 mg Oral Nightly    levothyroxine  75 mcg Oral Before breakfast    pantoprazole  20 mg Oral Daily    atorvastatin  20 mg Oral Nightly     Continuous Infusions:   PRN Meds:   acetaminophen    ondansetron    metoclopramide    PHYSICAL EXAM  Vital signs: Temp:  [97.6 °F (36.4 °C)-98.7 °F (37.1 °C)] 98.2 °F (36.8 °C)  Pulse:  [63-81] 73  Resp:  [18-24] 18  BP: (125-155)/(51-76) 127/56  SpO2:  [90 %-99 %] 95 %      GENERAL - NAD, AAO  EYES- sclera anicteric   HENT- normocephalic, OP - MMM  NECK - no JVD  CV- RRR  RESP - CTAB, normal resp effort  ABDOMEN- soft, NT/ND,   EXT- no LE edema        Data Review:   Labs:   Recent Labs   Lab 10/23/24  1046 10/24/24  0704   WBC 5.6 6.5   HGB 12.1* 11.6*   MCV 71.8* 73.5*   .0 178.0       Recent Labs   Lab 10/23/24  1046 10/24/24  0704    139   K 4.6 4.2    107   CO2 30.0 29.0   BUN 17 13   CREATSERUM 1.32* 1.18   CA 10.1 10.0   * 87       Recent Labs   Lab 10/23/24  1046   ALT 24   AST 38*   ALB 4.2       Recent Labs   Lab 10/23/24  1045   PGLU 161*           ASSESSMENT/PLAN:    Patient is a 89 year old male with hx of HLD, HTN, GERD, hypothyroidism, TIA , hx of R CEA,  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      Plan:    Syncope  - monitor on tele   - check orthostatic BP - neg  - check for infection, UA - neg  - given hx of TIA, hx of SDH neuro consulted   - in the past thought to be 2/2 low HR, BB was stopped, cards to see   - MRI pending  - EEG pending     HLD  - statin     HTN  - pt ace and amlodipine at home  -currently bp controlled wo meds       GERD  - ppi      Hypothyroidism   - synthroid      TIA  - home meds     Hx of R CEA  Hx of SDH  - US at duly 2023 stable  findings  - CTOH in ED no  acute issues noted       Prophylaxis:  DVT:  lovenox     Full code d/w pt and pts family.     Will continue to follow while hospitalized. Please page me or the on-call hospitalist with questions or concerns.    Rufino Ocampo Hospitalist  [phone]  Answering Service: [phone]

## 2024-10-24 NOTE — CM/SW NOTE
10/24/24 1400   CM/SW Referral Data   Referral Source Social Work (self-referral)   Reason for Referral Discharge planning   Informant Patient;Spouse/Significant Other;Daughter;EMR   Medical Hx   Does patient have an established PCP? Yes   Patient Info   Patient's Current Mental Status at Time of Assessment Alert;Oriented   Patient's Home Environment House   Patient lives with Spouse/Significant other   Patient Status Prior to Admission   Independent with ADLs and Mobility Yes   Discharge Needs   Anticipated D/C needs No anticipated discharge needs     SW self-referred for discharge planning. Pt is a 89 y/o male admitted with syncope. Noted therapy does not anticipate skilled therapy needs for pt at discharge. Anticipated therapy need: Home. JUSTINO met with pt, pt's spouse, and pt's family member at discharge.     Pt lives with his spouse and is typically independent with ADLs. Pt denied the need for resources at discharge and denied any further needs. Pt stated he is hopeful to discharge today. Updated RN. SW will continue to remain available.     NAHUM Bunch  Discharge Planner

## 2024-10-24 NOTE — PROGRESS NOTES
[facility]  GINGER Neurology Progress Note    Ernie Pulido Jr. Patient Status:  Observation    1932 MRN [id number]   Location [facility] 3NE-A Attending Rufino Junior, DO   Hosp Day # 0 PCP Joseph Helms MD     CC: Unresponsive episode    Subjective:  Ernie Pulido Jr. is a 89 year old male with PMH of CAD, GERD, Htn, HLD, prior SDH  who presented with a 5-minute unresponsive spell witnessed by his wife 10/23. He was also noted to become incontinent of urine during this episode, does not recall anything, no preceding symptoms. Patient was admitted for a concern fo possible seizure vs syncopal episode. Neurology was consulted.     Seen for a follow up visit today. Sitting in bed, awake, alert and oriented x 4. Denies any new episodes of disorientation or confusion. Regards and follows all commands. No new focal weakness, no numbness/tingling, no vision difficulties.         MEDICATIONS:  No current outpatient medications on file.     Current Facility-Administered Medications   Medication Dose Route Frequency    enoxaparin (Lovenox) 30 MG/0.3ML SUBQ injection 30 mg  30 mg Subcutaneous Daily    acetaminophen (Tylenol Extra Strength) tab 500 mg  500 mg Oral Q4H PRN    ondansetron (Zofran) 4 MG/2ML injection 4 mg  4 mg Intravenous Q6H PRN    metoclopramide (Reglan) 5 mg/mL injection 5 mg  5 mg Intravenous Q8H PRN    aspirin chewable tab 81 mg  81 mg Oral QAM    ezetimibe (Zetia) tab 10 mg  10 mg Oral Nightly    levothyroxine (Synthroid) tab 75 mcg  75 mcg Oral Before breakfast    pantoprazole (Protonix) DR tab 20 mg  20 mg Oral Daily    atorvastatin (Lipitor) tab 20 mg  20 mg Oral Nightly       REVIEW OF SYSTEMS:  A 10-point system was reviewed.  Pertinent positives and negatives are noted in HPI.      PHYSICAL EXAMINATION:  VITAL SIGNS: /56 (BP Location: Left arm)   Pulse 73   Temp 98.2 °F (36.8 °C) (Oral)   Resp 18   Ht 63\"   Wt 133 lb 14.4 oz (60.7 kg)   SpO2 95%   BMI  23.72 kg/m²   GENERAL:  Patient is a 89 year old male in no acute distress.  HEENT:  Normocephalic, atraumatic  ABD: Soft, non tender  SKIN: Warm, dry, no rashes    NEUROLOGICAL:   Mental status: Oriented to person, place, and time   Speech: Fluent, no dysarthria  Memory and comprehension: Intact   Cranial Nerves: VFF, PERRL 3mm brisk, EOMI, no nystagmus, facial sensation intact, face symmetric, tongue midline, shoulder shrug equal, remainder CN intact  Motor: No drift, no focal arm or leg weakness. Motor exam is 5 out of 5 in all extremities bilaterally   Sensory: Intact to light touch  Coordination: FTN intact  Gait: Deferred      Imaging/Diagnostics:  MRI BRAIN (W+WO) (CPT=70553)    Result Date: 10/23/2024  CONCLUSION:   1. No evidence of an acute infarct.  2. No enhancing lesions.  3. Mild to moderate FLAIR abnormalities the white matter likely sequelae of chronic small vessel ischemic disease.  4. Global parenchymal volume loss.    LOCATION:  [facility]    Dictated by (CST): Sudhakar Iniguez MD on 10/23/2024 at 11:46 PM     Finalized by (CST): Sudhakar Iniguez MD on 10/23/2024 at 11:49 PM       CT BRAIN OR HEAD (CPT=70450)    Result Date: 10/23/2024  CONCLUSION:  No acute intracranial hemorrhage or depressed calvarial fracture.  There is diffuse cerebral and cerebellar atrophy with chronic microvascular ischemic changes of aging.  LOCATION:  [id number]   Dictated by (CST): Susie Lyle MD on 10/23/2024 at 12:44 PM     Finalized by (CST): Susie Lyle MD on 10/23/2024 at 12:46 PM       XR CHEST AP PORTABLE  (CPT=71045)    Result Date: 10/23/2024  CONCLUSION:  Nonspecific bronchial wall thickening, but consider bronchitis.    LOCATION:  [facility]      Dictated by (CST): Chilo Oakes MD on 10/23/2024 at 12:00 PM     Finalized by (CST): Chilo Oakes MD on 10/23/2024 at 12:01 PM          Labs:  Recent Labs   Lab 10/23/24  1046 10/24/24  0704   RBC 5.18 5.02   HGB 12.1* 11.6*   HCT 37.2* 36.9*   MCV 71.8* 73.5*    MCH 23.4* 23.1*   MCHC 32.5 31.4   RDW 14.6 14.8   NEPRELIM 3.52 3.91   WBC 5.6 6.5   .0 178.0         Recent Labs   Lab 10/23/24  1046 10/24/24  0704   * 87   BUN 17 13   CREATSERUM 1.32* 1.18   EGFRCR 51* 58*   CA 10.1 10.0    139   K 4.6 4.2    107   CO2 30.0 29.0       Pre-morbid mRS 0      Assessment and Plan:    A 89 year old male with PMH of CAD, GERD, HTN, HLD, prior TIA who presents with a 5-minute staring spell witnessed by his wife.    Unresponsive episode - has had similar event in the recent past when his BP was too low and his metoprolol was discontinued at that time, as well as his lisinopril increased. Also hx of SDH in 2019 and prior EEG with right sided sharps.   CT head - no acute changes  MRI brain - no acute pathology, no structural abnormalities.  Routine EEG - per preliminary reading by dr. Hoover, \"unrevealing, no seizures\".   Orthostatic vitals - negative  PT/OT/ST eval   Fall and safety precautions  Given negative neurological work up, unlikely neuro etiology. Cardiac medications to be adjusted by Cards if indicated.   Discussed with patient in detail, verbalized understanding. Discussed with dr. Hernández. No further inpatient  neurological work up indicated at this time. Will sign off, please feel free to call with any new questions or concerns.      Is this a shared or split note between Advanced Practice Provider and Physician? No       Yelitza Rai Banner  10/24/2024, 9:27 AM

## 2024-10-24 NOTE — PLAN OF CARE
Assumed care of patient following shift report. Patient is alert and oriented. On room air. Wanting to go home. MRI brain completed. Neurologically doing well, monitoring mental status closely. Patient denies pain. Call light in reach. See MAR & flowsheets for additional information.          Problem: SAFETY ADULT - FALL  Goal: Free from fall injury  Description: INTERVENTIONS:  - Assess pt frequently for physical needs  - Identify cognitive and physical deficits and behaviors that affect risk of falls.  - [place] fall precautions as indicated by assessment.  - Educate pt/family on patient safety including physical limitations  - Instruct pt to call for assistance with activity based on assessment  - Modify environment to reduce risk of injury  - Provide assistive devices as appropriate  - Consider OT/PT consult to assist with strengthening/mobility  - Encourage toileting schedule  Outcome: Progressing     Problem: NEUROLOGICAL - ADULT  Goal: Achieves stable or improved neurological status  Description: INTERVENTIONS  - Assess for and report changes in neurological status  - Initiate measures to prevent increased intracranial pressure  - Maintain blood pressure and fluid volume within ordered parameters to optimize cerebral perfusion and minimize risk of hemorrhage  - Monitor temperature, glucose, and sodium. Initiate appropriate interventions as ordered  Outcome: Progressing  Goal: Absence of seizures  Description: INTERVENTIONS  - Monitor for seizure activity  - Administer anti-seizure medications as ordered  - Monitor neurological status  Outcome: Progressing  Goal: Remains free of injury related to seizure activity  Description: INTERVENTIONS:  - Maintain airway, patient safety  and administer oxygen as ordered  - Monitor patient for seizure activity, document and report duration and description of seizure to MD/LIP  - If seizure occurs, turn patient to side and suction secretions as needed  - Reorient patient  post seizure  - Seizure pads on all 4 side rails  - Instruct patient/family to notify RN of any seizure activity  - Instruct patient/family to call for assistance with activity based on assessment  Outcome: Progressing  Goal: Achieves maximal functionality and self care  Description: INTERVENTIONS  - Monitor swallowing and airway patency with patient fatigue and changes in neurological status  - Encourage and assist patient to increase activity and self care with guidance from PT/OT  - Encourage visually impaired, hearing impaired and aphasic patients to use assistive/communication devices  Outcome: Progressing

## 2024-10-24 NOTE — PLAN OF CARE
Assumed care at 0730  A/Ox4, RA, VSS  Tele, NSR/SB  Denies n/v & pain   Reg diet  Safety measures in place  All needs met at this time    Problem: SAFETY ADULT - FALL  Goal: Free from fall injury  Description: INTERVENTIONS:  - Assess pt frequently for physical needs  - Identify cognitive and physical deficits and behaviors that affect risk of falls.  - [place] fall precautions as indicated by assessment.  - Educate pt/family on patient safety including physical limitations  - Instruct pt to call for assistance with activity based on assessment  - Modify environment to reduce risk of injury  - Provide assistive devices as appropriate  - Consider OT/PT consult to assist with strengthening/mobility  - Encourage toileting schedule  Outcome: Progressing     Problem: NEUROLOGICAL - ADULT  Goal: Achieves stable or improved neurological status  Description: INTERVENTIONS  - Assess for and report changes in neurological status  - Initiate measures to prevent increased intracranial pressure  - Maintain blood pressure and fluid volume within ordered parameters to optimize cerebral perfusion and minimize risk of hemorrhage  - Monitor temperature, glucose, and sodium. Initiate appropriate interventions as ordered  Outcome: Progressing  Goal: Absence of seizures  Description: INTERVENTIONS  - Monitor for seizure activity  - Administer anti-seizure medications as ordered  - Monitor neurological status  Outcome: Progressing  Goal: Remains free of injury related to seizure activity  Description: INTERVENTIONS:  - Maintain airway, patient safety  and administer oxygen as ordered  - Monitor patient for seizure activity, document and report duration and description of seizure to MD/LIP  - If seizure occurs, turn patient to side and suction secretions as needed  - Reorient patient post seizure  - Seizure pads on all 4 side rails  - Instruct patient/family to notify RN of any seizure activity  - Instruct patient/family to call for  assistance with activity based on assessment  Outcome: Progressing  Goal: Achieves maximal functionality and self care  Description: INTERVENTIONS  - Monitor swallowing and airway patency with patient fatigue and changes in neurological status  - Encourage and assist patient to increase activity and self care with guidance from PT/OT  - Encourage visually impaired, hearing impaired and aphasic patients to use assistive/communication devices  Outcome: Progressing     Problem: Patient/Family Goals  Goal: Patient/Family Long Term Goal  Description: Patient's Long Term Goal: dc home    Interventions:  - neuro consult  - See additional Care Plan goals for specific interventions  Outcome: Progressing  Goal: Patient/Family Short Term Goal  Description: Patient's Short Term Goal:     Interventions:   -   - See additional Care Plan goals for specific interventions  Outcome: Progressing

## 2024-10-24 NOTE — CONSULTS
[facility]  Report of Consultation    Ernie Pulido Jr. Patient Status:  Observation    1932 MRN [id number]   [facility] 3NE-A Attending Rufino Junior, DO   Hosp Day # 0 PCP Joseph Helms MD     Reason for Consultation:   Unresponsive episode / syncope    History of Present Illness:   Ernie Pulido Jr. is a(n) 89 year old male with a past history of HTN, HL, hypothyroidism, PVD, and CAD. Pt has Hx of PTCA to unknown vessel in  (possibly LAD). Cath  with non-obstructive CAD. Pt has undergone CEA on the R following TIA. Carotid US stable. Pt presents after syncope.  Pt has no recall of events.  Wife provides Hx.  Pt awoke yesterday and took his typical medications.  Reports compliance with his medications and does not feel as though he mistakenly took more than normal.  Lef his wife in the kitchen and sat down on his sofa.  Wife then heard him moaning. Went to check on him and noted that he was unresponsive.  Called EMS.  Was able to get attention of neighbor who performed sternal rub- states pt was responsive to this, but lethargic.  EMS arrived.  Told that BP was \"quite low\" .  Brought to hospital.  BP normotensive. No CP.  No SOB.  No palpitations.  Had evaluation of similar in  - NL EF, PAP mildly elevated, Stress with no ischemia, patch monitor wihtout complex arrhythmia.        Past Medical History:   Past Medical History:    Atherosclerosis of coronary artery    Cataract    Coronary atherosclerosis of unspecified type of vessel, native or graft    Esophageal reflux    GERD    Heart attack (HCC)    High blood pressure    High cholesterol    HYPERLIPIDEMIA    HYPERTENSION    HYPOTHYROIDISM    Osteoarthritis    OSTEOPENIA    SINUSITIS    Stroke (HCC)    tia    Subdural hematoma (HCC)    Unspecified disorder of thyroid    Unspecified essential hypertension    Visual impairment       Social History:   Smokin years X 1 ppd. Quit at 52 yo.   Alcohol:   None    Family History:   No family history of premature arthrosclerotic heart disease     Medications:   Scheduled:    enoxaparin  30 mg Subcutaneous Daily    aspirin  81 mg Oral QAM    ezetimibe  10 mg Oral Nightly    levothyroxine  75 mcg Oral Before breakfast    pantoprazole  20 mg Oral Daily    atorvastatin  20 mg Oral Nightly       Continuous Infusion:       PRN Medications:     acetaminophen    ondansetron    metoclopramide    Outpatient Medications:   Medications Ordered Prior to Encounter[1]    Allergies:   Allergies[2]    Review of Systems:   No fevers, chills, change in weight or bowel habits.  Ten point review of systems is otherwise negative or unremarkable.    Physical Exam:   Vitals:    10/24/24 1154   BP:    Pulse: 68   Resp:    Temp:      Wt Readings from Last 3 Encounters:   10/23/24 133 lb 14.4 oz (60.7 kg)   11/30/23 136 lb 14.5 oz (62.1 kg)   10/23/23 140 lb (63.5 kg)           General: Well developed, well nourished male.  Pt is in no acute distress.  HEENT:   Normocephalic.  Atraumatic.  Eyes with no scleral icterus.  Neck: Supple.  No JVD.  Carotids 2+ and equal in symmetric fashion.  No bruits are noted.  Cardiac: Regular rate and rhythm.   There is a normal S1 and S2.  No S3 or S4.  No murmurs, rubs, or gallops.  PMI is non-displaced with a normal apical impulse.  Lungs: Clear to ascultation bilaterally.  No focal rales, rhonchi, or wheezes.  Good air movement is noted throughout all lung fields.  Abdomen: Soft.  Non-distended.  Non-tender.  Bowel sounds are present and normoactive.  No guarding or rebound.   Extremities: Extremities do not demonstrate any evidence of peripheral edema.   No cyanosis or clubbing of the digits is appreciated.  Femoral, Dorsalis Pedis, and Posterior Tibialis  pulses are 2+ and equal in a symmetric fashion.  Neurologic: Alert and oriented, normal affect.  No gross deficit appreciated.  Integument:  No visible rashes are appreciated.      Laboratories and Data:    Labs:    Recent Labs   Lab 10/23/24  1046 10/24/24  0704   * 87   BUN 17 13   CREATSERUM 1.32* 1.18   CA 10.1 10.0   ALB 4.2  --     139   K 4.6 4.2    107   CO2 30.0 29.0   ALKPHO 51  --    AST 38*  --    ALT 24  --    BILT 0.9  --    TP 6.3  --        Recent Labs   Lab 10/23/24  1046 10/24/24  0704   RBC 5.18 5.02   HGB 12.1* 11.6*   HCT 37.2* 36.9*   MCV 71.8* 73.5*   MCH 23.4* 23.1*   MCHC 32.5 31.4   RDW 14.6 14.8   NEPRELIM 3.52 3.91   WBC 5.6 6.5   .0 178.0       No results for input(s): \"PTP\", \"INR\" in the last 168 hours.    No results for input(s): \"TROP\", \"CK\" in the last 168 hours.    Diagnostics:   Tele:  SR  EKG: SR.  Low voltage.  PRWP.        Assessment:   Unresponsive episode yesterday  -Hypotension reported near that time  -?Medication effect  -?Arrythmia  2.  Bradycardia                -No recurrence on tele  3.  Past sudden syncope  4.  HTN  5.  HL  6.  Hypothyroidism  7.   PVD                -S/P R CEA                 -Stable US 9/23  9.  CAD                -PTCA 1994 unknown vessel                -Stable non-obstructive CAD 2015   -Stress 12/23 with no provoked ischemia  10.  NL EF      Plan:   ASA   Statin   Echo   Tele monitor    Hold antihypertensives - at present BP is NL despite the fact that he is off of antihypertensives.  Monitor hemodynamics off antihypertensives   Consider ILR if recurrent Sx and above unremarkable  Neuro following    Shahid Matthew MD  10/24/2024  3:40 PM         [1]   No current facility-administered medications on file prior to encounter.     Current Outpatient Medications on File Prior to Encounter   Medication Sig Dispense Refill    ezetimibe 10 MG Oral Tab Take 1 tablet (10 mg total) by mouth nightly.      simvastatin 40 MG Oral Tab Take 1 tablet (40 mg total) by mouth nightly.      lisinopril 40 MG Oral Tab Take 1 tablet (40 mg total) by mouth daily.      pantoprazole 20 MG Oral Tab EC Take 1 tablet (20 mg total) by mouth daily.       Levothyroxine Sodium 75 MCG Oral Tab Take 1 tablet (75 mcg total) by mouth daily. 90 tablet 3    amLODIPine Besylate 5 MG Oral Tab Take 1 tablet (5 mg total) by mouth daily. 90 tablet 3    aspirin 81 MG Oral Tab Take 1 tablet (81 mg total) by mouth every morning.      FOLIC ACID OR Take 1 tablet by mouth every morning.      Multiple Vitamins-Minerals (TAB-A-MARCELLE MAXIMUM) Oral Tab Take 1 tablet by mouth daily.     [2]   Allergies  Allergen Reactions    Hydrochlorothiazide RASH     Skin rash with thiazide diuretic    Morphine [Morphine Sulfate In Dextrose] OTHER (SEE COMMENTS)     hypotension    Sulphadimidine Sodium [Sulfamethazine Sodium] OTHER (SEE COMMENTS)     Unsure reaction

## 2024-10-24 NOTE — OCCUPATIONAL THERAPY NOTE
OCCUPATIONAL THERAPY EVALUATION - INPATIENT    Room Number: 3613/3613-A  Evaluation Date: 10/24/2024     Type of Evaluation: Initial  Presenting Problem: Syncope    Physician Order: IP Consult to Occupational Therapy  Reason for Therapy:  ADL/IADL Dysfunction and Discharge Planning    OCCUPATIONAL THERAPY ASSESSMENT   Patient is a 89 year old male admitted on 10/23/2024 with Presenting Problem: Syncope. Co-Morbidities : JUDY, PVD, & Hx of subdural hemorrhage  Patient is currently functioning at baseline with toileting, lower body dressing, grooming, bed mobility, transfers, static sitting balance, dynamic sitting balance, static standing balance, dynamic standing balance, maintaining seated position, and functional standing tolerance.  Prior to admission, patient's baseline is  IND w/ all BADLs.  Patient met all OT goals at SBA & SUP. SBA for ADLs functional transfers & balance and SUP for bed mobility. level and  Patient reports no further questions/concerns at this time.       Recommendations for nursing staff:   Transfers: SBA   Toileting location: Toilet    EVALUATION SESSION:  Patient at start of session: Semi-supine in bed     FUNCTIONAL TRANSFER ASSESSMENT  Sit to Stand: Edge of Bed  Edge of Bed: Stand-by Assist  Toilet Transfer: Stand-by Assist    BED MOBILITY  Supine to Sit : Supervision  Scooting: Scooted to EOB w/ SUP.    BALANCE ASSESSMENT  Static Sitting: Stand-by Assist  Static Standing: Stand-by Assist    FUNCTIONAL ADL ASSESSMENT  LB Dressing Seated: Stand-by Assist (Pt. demonstarted LB dressing by completing figure 4 position w/ BLE.)  Toileting Seated: Stand-by Assist (Pt. simulated bharathi-care by demonstarting trunk flexion, good unsupported sitting tolerance, & BUE function while sitting on std. height toilet.)  Toileting Standing: Stand-by Assist (Pt. demonstrated toilet transfer by completing sit to stand/stand to sit transfer w/ grab bar from std. height toilet.)    ACTIVITY TOLERANCE: Vitals  stable. Pt. Was able to perform bed mobility, transfer from sitting to standing from bed, ambulate down [place] & back to room. Once pt. Entered room again pt. Performed toilet transfer and ended session w/ completing stand to sit transfer to chair.                          O2 SATURATIONS       COGNITION  Overall Cognitive Status:  WFL - within functional limits    COGNITION ASSESSMENTS     Upper Extremity:   ROM: within functional limits   Strength: is within functional limits   Coordination:  Gross motor: WNL  Fine motor: WNL  Sensation: Light touch:  intact    EDUCATION PROVIDED  Patient Education : Role of Occupational Therapy; Plan of Care; Discharge Recommendations  Patient's Response to Education: Verbalized Understanding    Equipment used: gait belt   Demonstrates functional use    Therapist Comments: Recommended shower chair due to hx of syncope & that he stands while he showers.     Patient End of Session: Up in chair;Needs met;Call light within reach;All patient questions and concerns addressed;Hospital anti-slip socks;Alarm set    OCCUPATIONAL PROFILE    HOME SITUATION  Type of Home: House  Home Layout: One level  Lives With: Spouse    Toilet and Equipment: Standard height toilet  Shower/Tub and Equipment: Walk-in shower  Other Equipment: None                  Prior Level of Function: Pt. Was IND w/ all BADLs. Pt. Lives w/ spouse in a one story home w/ one step to enter. Pt. Does not use any mobility devices but states that he has a RW that his wife uses.     SUBJECTIVE  Pt. Was pleasant & cooperative.     PAIN ASSESSMENT  Rating: Unable to rate  Location: Did not state pain at this time.       OBJECTIVE  Precautions: Bed/chair alarm  Fall Risk: Standard fall risk    WEIGHT BEARING RESTRICTION       AM-PAC ‘6-Clicks’ Inpatient Daily Activity Short Form  -   Putting on and taking off regular lower body clothing?: A Little  -   Bathing (including washing, rinsing, drying)?: A Little  -   Toileting, which  includes using toilet, bedpan or urinal? : None  -   Putting on and taking off regular upper body clothing?: None  -   Taking care of personal grooming such as brushing teeth?: None  -   Eating meals?: None    AM-PAC Score:  Score: 22  Approx Degree of Impairment: 25.8%  Standardized Score (AM-PAC Scale): 47.1    ADDITIONAL TESTS     NEUROLOGICAL FINDINGS      PLAN   Patient has been evaluated and presents with no skilled Occupational Therapy needs at this time.  Patient discharged from Occupational Therapy services.  Please re-order if a new functional limitation presents during this admission.    OT Device Recommendations: Shower chair    Patient Evaluation Complexity Level:   Occupational Profile/Medical History LOW - Brief history including review of medical or therapy records    Specific performance deficits impacting engagement in ADL/IADL LOW  1 - 3 performance deficits    Client Assessment/Performance Deficits LOW - No comorbidities nor modifications of tasks    Clinical Decision Making LOW - Analysis of occupational profile, problem-focused assessments, limited treatment options    Overall Complexity LOW     OT Session Time: 15 minutes  Self-Care Home Management: 5 minutes  Therapeutic Activity: 8 minutes  Neuromuscular Re-education: 0 minutes  Therapeutic Exercise: 0 minutes  Cognitive Skills: 0 minutes  Sensory Integrative: 0 minutes  Orthotic Management and Trainin minutes  Can add/delete any of these

## 2024-10-25 ENCOUNTER — APPOINTMENT (OUTPATIENT)
Dept: CV DIAGNOSTICS | Facility: HOSPITAL | Age: 89
End: 2024-10-25
Attending: INTERNAL MEDICINE
Payer: MEDICARE

## 2024-10-25 VITALS
BODY MASS INDEX: 23.72 KG/M2 | OXYGEN SATURATION: 93 % | HEIGHT: 63 IN | WEIGHT: 133.88 LBS | TEMPERATURE: 98 F | DIASTOLIC BLOOD PRESSURE: 56 MMHG | HEART RATE: 64 BPM | SYSTOLIC BLOOD PRESSURE: 113 MMHG | RESPIRATION RATE: 18 BRPM

## 2024-10-25 PROCEDURE — 93306 TTE W/DOPPLER COMPLETE: CPT | Performed by: INTERNAL MEDICINE

## 2024-10-25 NOTE — PLAN OF CARE
Pt is A&O x4. Pleasant. VSS- NSR on tele. RA. Orthostatic BP Q shift. PIV to RAC is SL. General diet. Up ad chapito to BR. Steady gait. Pt denies pain/nausea/dizziness.   Cards following case. Plan for echo tomorrow.     Problem: SAFETY ADULT - FALL  Goal: Free from fall injury  Description: INTERVENTIONS:  - Assess pt frequently for physical needs  - Identify cognitive and physical deficits and behaviors that affect risk of falls.  - [place] fall precautions as indicated by assessment.  - Educate pt/family on patient safety including physical limitations  - Instruct pt to call for assistance with activity based on assessment  - Modify environment to reduce risk of injury  - Provide assistive devices as appropriate  - Consider OT/PT consult to assist with strengthening/mobility  - Encourage toileting schedule  Outcome: Progressing     Problem: NEUROLOGICAL - ADULT  Goal: Achieves stable or improved neurological status  Description: INTERVENTIONS  - Assess for and report changes in neurological status  - Initiate measures to prevent increased intracranial pressure  - Maintain blood pressure and fluid volume within ordered parameters to optimize cerebral perfusion and minimize risk of hemorrhage  - Monitor temperature, glucose, and sodium. Initiate appropriate interventions as ordered  Outcome: Progressing  Goal: Absence of seizures  Description: INTERVENTIONS  - Monitor for seizure activity  - Administer anti-seizure medications as ordered  - Monitor neurological status  Outcome: Progressing  Goal: Remains free of injury related to seizure activity  Description: INTERVENTIONS:  - Maintain airway, patient safety  and administer oxygen as ordered  - Monitor patient for seizure activity, document and report duration and description of seizure to MD/LIP  - If seizure occurs, turn patient to side and suction secretions as needed  - Reorient patient post seizure  - Seizure pads on all 4 side rails  - Instruct patient/family  to notify RN of any seizure activity  - Instruct patient/family to call for assistance with activity based on assessment  Outcome: Progressing  Goal: Achieves maximal functionality and self care  Description: INTERVENTIONS  - Monitor swallowing and airway patency with patient fatigue and changes in neurological status  - Encourage and assist patient to increase activity and self care with guidance from PT/OT  - Encourage visually impaired, hearing impaired and aphasic patients to use assistive/communication devices  Outcome: Progressing

## 2024-10-25 NOTE — PROGRESS NOTES
10/24/24 2203 10/24/24 2204 10/24/24 2206   Vitals   /56 120/69 124/52   MAP (mmHg) 77 86 74   BP Location Right arm Right arm Right arm   BP Method Automatic Automatic Automatic   Patient Position Lying Sitting Standing

## 2024-10-25 NOTE — PLAN OF CARE
A/Ox4. Very pleasant and cooperative.  On RA. Tele-NSR.  Had echo this AM, awaiting results.  Cards following. If echo ok and BP remains stable, may DC home today with f/u in 2-3 weeks.  Orthos Qshift. Negative this AM.  Up ad chapito.  Denies any pain or needs at this time.  Staff will cont to monitor.      Problem: NEUROLOGICAL - ADULT  Goal: Achieves stable or improved neurological status  Description: INTERVENTIONS  - Assess for and report changes in neurological status  - Initiate measures to prevent increased intracranial pressure  - Maintain blood pressure and fluid volume within ordered parameters to optimize cerebral perfusion and minimize risk of hemorrhage  - Monitor temperature, glucose, and sodium. Initiate appropriate interventions as ordered  Outcome: Progressing  Goal: Absence of seizures  Description: INTERVENTIONS  - Monitor for seizure activity  - Administer anti-seizure medications as ordered  - Monitor neurological status  Outcome: Progressing  Goal: Remains free of injury related to seizure activity  Description: INTERVENTIONS:  - Maintain airway, patient safety  and administer oxygen as ordered  - Monitor patient for seizure activity, document and report duration and description of seizure to MD/LIP  - If seizure occurs, turn patient to side and suction secretions as needed  - Reorient patient post seizure  - Seizure pads on all 4 side rails  - Instruct patient/family to notify RN of any seizure activity  - Instruct patient/family to call for assistance with activity based on assessment  Outcome: Progressing  Goal: Achieves maximal functionality and self care  Description: INTERVENTIONS  - Monitor swallowing and airway patency with patient fatigue and changes in neurological status  - Encourage and assist patient to increase activity and self care with guidance from PT/OT  - Encourage visually impaired, hearing impaired and aphasic patients to use assistive/communication devices  Outcome:  Progressing     Problem: SAFETY ADULT - FALL  Goal: Free from fall injury  Description: INTERVENTIONS:  - Assess pt frequently for physical needs  - Identify cognitive and physical deficits and behaviors that affect risk of falls.  - [place] fall precautions as indicated by assessment.  - Educate pt/family on patient safety including physical limitations  - Instruct pt to call for assistance with activity based on assessment  - Modify environment to reduce risk of injury  - Provide assistive devices as appropriate  - Consider OT/PT consult to assist with strengthening/mobility  - Encourage toileting schedule  Outcome: Progressing     Problem: Patient/Family Goals  Goal: Patient/Family Long Term Goal  Description: Patient's Long Term Goal:     Interventions:  -   - See additional Care Plan goals for specific interventions  Outcome: Progressing  Goal: Patient/Family Short Term Goal  Description: Patient's Short Term Goal:     Interventions:   -   - See additional Care Plan goals for specific interventions  Outcome: Progressing

## 2024-10-25 NOTE — PROGRESS NOTES
Orthos        10/25/24 0910 10/25/24 0912 10/25/24 0914   Vitals   Temp 98 °F (36.7 °C)  --   --    Temp src Oral  --   --    Pulse 66 73 72   Heart Rate Source Monitor  --   --    Resp 18  --   --    /52 146/55 131/42   MAP (mmHg) 80 82 67   BP Location Right arm Right arm Right arm   BP Method Automatic Automatic Automatic   Patient Position Lying Sitting Standing

## 2024-10-25 NOTE — PROGRESS NOTES
[facility]  Progress Note    Ernie Pulido JrLuis Patient Status:  Inpatient    1932 MRN [id number]   Location [facility] 3NE-A Attending Rufino Junior,    Hosp Day # 1 PCP Joseph Helms MD     Subjective:   No chest pain.  No shortness of breath.  No focal cardiovascular complaints reported. No further presyncope or syncope.    Objective:  Vitals:    10/24/24 2204 10/24/24 2206 10/24/24 2336 10/25/24 0410   BP: 120/69 124/52 (!) 118/97 147/71   BP Location: Right arm Right arm Right arm Right arm   Pulse: 72 79 72 72   Resp:   18 18   Temp:   98.1 °F (36.7 °C) 97.5 °F (36.4 °C)   TempSrc:   Oral Oral   SpO2: 94% 95% 94% 98%   Weight:       Height:           Temp (24hrs), Av.9 °F (36.6 °C), Min:97.5 °F (36.4 °C), Max:98.4 °F (36.9 °C)      Medications:   Scheduled:    enoxaparin  30 mg Subcutaneous Daily    aspirin  81 mg Oral QAM    ezetimibe  10 mg Oral Nightly    levothyroxine  75 mcg Oral Before breakfast    pantoprazole  20 mg Oral Daily    atorvastatin  20 mg Oral Nightly       Continuous Infusion:       PRN Medications:     acetaminophen    ondansetron    metoclopramide    Intake/Output:     Intake/Output Summary (Last 24 hours) at 10/25/2024 0907  Last data filed at 10/24/2024 2300  Gross per 24 hour   Intake 360 ml   Output --   Net 360 ml       Wt Readings from Last 3 Encounters:   10/23/24 133 lb 14.4 oz (60.7 kg)   23 136 lb 14.5 oz (62.1 kg)   10/23/23 140 lb (63.5 kg)       Allergies:  Allergies[1]    Physical Exam:   General:  Well-developed / Well-nourished.  No acute distress.  HEENT:  Normocephalic.  Atraumatic.  No icterus.  Neck:  There is no jugular venous distention.   Cardiovascular:  Cardiovascular examination demonstrates a regular rate and rhythm.  There is normal S1, S2.  There is no S3 or S4.  There are no murmurs, rubs, or gallops.  No click is appreciated.  PMI is nondisplaced with a normal apical impulse.    Pulmonary:  Lungs are clear to  auscultation bilaterally.  There are no focal rales, rhonchi, or wheezes.  Good air movement is noted throughout both lung fields.   Abdomen:  The abdomen is soft, non-distended, and non-tender.  Bowel sounds are present and normoactive.  No organomegaly is appreciated.  Extremities:  Extremities do not demonstrate any evidence of peripheral edema.   No cyanosis or clubbing of the digits is appreciated.  Neurologic:  Alert and oriented.  Normal affect.  Integument:  No visible rashes are appreciated.      Laboratory/Data:   Recent Labs   Lab 10/23/24  1046 10/24/24  0704   * 87   BUN 17 13   CREATSERUM 1.32* 1.18   CA 10.1 10.0   ALB 4.2  --     139   K 4.6 4.2    107   CO2 30.0 29.0   ALKPHO 51  --    AST 38*  --    ALT 24  --    BILT 0.9  --    TP 6.3  --        Recent Labs   Lab 10/23/24  1046 10/24/24  0704   RBC 5.18 5.02   HGB 12.1* 11.6*   HCT 37.2* 36.9*   MCV 71.8* 73.5*   MCH 23.4* 23.1*   MCHC 32.5 31.4   RDW 14.6 14.8   NEPRELIM 3.52 3.91   WBC 5.6 6.5   .0 178.0       No results for input(s): \"PTP\", \"INR\" in the last 168 hours.    No results for input(s): \"TROP\", \"CK\" in the last 168 hours.      Tele: SR    Assessment:     Unresponsive episode yesterday  -Hypotension reported near that time  -Suspect medication effect.  Now normotensive off of all antihypertensives.  -Doubt arrythmia  2.  Bradycardia                -No recurrence on tele  3.  Past sudden syncope   -Felt Sx improved in the past with reduction of antihypertensives  4.  HTN  5.  HL  6.  Hypothyroidism  7.   PVD                -S/P R CEA                 -Stable US 9/23  9.  CAD                -PTCA 1994 unknown vessel                -Stable non-obstructive CAD 2015                -Stress 12/23 with no provoked ischemia  10.  NL EF    Plan:    ASA   Statin   Echo pending   Tele monitor while hospitalized - no complex arrhythmia to date during this hospitalization   Monitor hemodynamics off antihypertensives.   Anticipate discharge without Norvasc / Lisinopril if BP remains stable today.  ILR if recurrent Sx   Neuro following  Anticipate discharge home later today if Echo stable and BP remains stable off of antihypertensives  F/U 2-3 weeks    Shahid Matthew MD  10/25/2024  9:07 AM         [1]   Allergies  Allergen Reactions    Hydrochlorothiazide RASH     Skin rash with thiazide diuretic    Morphine [Morphine Sulfate In Dextrose] OTHER (SEE COMMENTS)     hypotension    Sulphadimidine Sodium [Sulfamethazine Sodium] OTHER (SEE COMMENTS)     Unsure reaction

## 2024-10-25 NOTE — DISCHARGE SUMMARY
[facility] Discharge Summary    Patient ID  Ernie Pulido Jr.  [id number]  89 year old  7/22/1932    Admit date: 10/23/2024    Discharge date: 10/25/24    Attending: Rufino Junior DO     Primary Care Physician: Joseph Helms MD      Reason for admission: syncope    Discharge condition: stable    Disposition: home    Important follow up:  -PCP within 7 d - coordinator contacted for appt  -specialists:     -labs:    -radiology:      Additional patient instructions       Discharge med list     Medication List        ASK your doctor about these medications      amLODIPine 5 MG Tabs  Commonly known as: Norvasc  Take 1 tablet (5 mg total) by mouth daily.     aspirin 81 MG Tabs     ezetimibe 10 MG Tabs  Commonly known as: Zetia     FOLIC ACID OR     levothyroxine 75 MCG Tabs  Commonly known as: Synthroid  Take 1 tablet (75 mcg total) by mouth daily.     lisinopril 40 MG Tabs  Commonly known as: Prinivil; Zestril     pantoprazole 20 MG Tbec  Commonly known as: Protonix     simvastatin 40 MG Tabs  Commonly known as: Zocor     Tab-A-Samuel Maximum Tabs              Discharge Diagnoses:    Syncope  HTN  HLD  GERD  Hypothyroidism  TIA  Hx of  R CEA  Hx of SDH      Consults:  IP CONSULT TO NEUROLOGY  IP CONSULT TO CARDIOLOGY    Radiology:  MRI BRAIN (W+WO) (CPT=70553)    Result Date: 10/23/2024  PROCEDURE:  MRI BRAIN (W+WO) (CPT=70553)  COMPARISON:  [facility] , CT, CT BRAIN OR HEAD (09433), 10/23/2024, 12:18 PM.  [facility] , MR, MRI BRAIN MRA HEAD+MRA NECK (ALL W+WO) (CPT=70553/16283/42641), 3/19/2019, 0:07 AM.  INDICATIONS:  Staring spells, possible seizure  TECHNIQUE:  MRI of the brain was performed with multi-planar T1, T2-weighted images with FLAIR sequences and diffusion weighted images without and with infusion.  PATIENT STATED HISTORY:(As transcribed by Technologist)  Pt come to the hospital for an evaluation for possible seizure.   CONTRAST USED:  12 mL of Dotarem  FINDINGS:   Prominence of the sulci is noted.    There is no midline shift or mass effect.   The basal cisterns are patent.   The craniocervical junction is unremarkable.   Midline structures including corpus callosum, optic chiasm and cerebellar tonsils are overall unremarkable.  There is no acute intracranial hemorrhage or extra-axial fluid collection identified.   Mild to moderate FLAIR abnormalities the white matter are again noted.   No significant abnormal parenchymal gradient susceptibility.   There is no abnormal parenchymal or leptomeningeal enhancement.  There is no restricted diffusion to suggest acute ischemia/infarction.  Hippocampi are symmetric.  The visualized paranasal sinuses and mastoid air cells are unremarkable.   The expected major intracranial flow voids are present.            CONCLUSION:   1. No evidence of an acute infarct.  2. No enhancing lesions.  3. Mild to moderate FLAIR abnormalities the white matter likely sequelae of chronic small vessel ischemic disease.  4. Global parenchymal volume loss.    LOCATION:  [facility]    Dictated by (CST): Sudhakar Iniguez MD on 10/23/2024 at 11:46 PM     Finalized by (CST): Sudhakar Iniguez MD on 10/23/2024 at 11:49 PM       CT BRAIN OR HEAD (CPT=70450)    Result Date: 10/23/2024  PROCEDURE:  CT BRAIN OR HEAD (64002)  COMPARISON:  [facility] , CT, CT BRAIN OR HEAD (16296), 11/30/2023, 4:18 PM.  INDICATIONS:  sz  TECHNIQUE:  Noncontrast CT scanning is performed through the brain. Dose reduction techniques were used. Dose information is transmitted to the ACR (American College of Radiology) NRDR (National Radiology Data Registry) which includes the Dose Index Registry.  PATIENT STATED HISTORY: (As transcribed by Technologist)  Patient states fatigue and neck discomfort.    FINDINGS:  VENTRICLES/SULCI:  Diffuse cerebral and cerebellar atrophy. INTRACRANIAL:  No acute intracranial hemorrhage or midline shift.  No mass effect.  There is patchy decreased attenuation in the periventricular white matter both  cerebral hemispheres which is nonspecific although compatible with chronic small vessel ischemic changes of aging. SINUSES:           Mucosal thickening ethmoid air cells.  MASTOIDS:          No sign of acute inflammation. SKULL:             No depressed calvarial fracture.  Postsurgical changes of right parietal calvarial urbano hole.            CONCLUSION:  No acute intracranial hemorrhage or depressed calvarial fracture.  There is diffuse cerebral and cerebellar atrophy with chronic microvascular ischemic changes of aging.  LOCATION:  [id number]   Dictated by (CST): Susie Lyle MD on 10/23/2024 at 12:44 PM     Finalized by (CST): Susie Lyle MD on 10/23/2024 at 12:46 PM       XR CHEST AP PORTABLE  (CPT=71045)    Result Date: 10/23/2024  PROCEDURE:  XR CHEST AP PORTABLE  (CPT=71045)  TECHNIQUE:  AP chest radiograph was obtained.  COMPARISON:  [facility] , XR, XR CHEST AP PORTABLE  (CPT=71045), 11/30/2023, 3:59 PM.  INDICATIONS:  sz seizure  PATIENT STATED HISTORY: (As transcribed by Technologist)  Patient offered no additional history at this time.    FINDINGS:  Tortuous aorta.  The heart is borderline normal in size.  Bronchial wall thickening is present.  No signs of bronchial dilation or obvious bronchiectasis.  No focal consolidation is seen.  The costophrenic angles are sharp.  No sign of pneumothorax.            CONCLUSION:  Nonspecific bronchial wall thickening, but consider bronchitis.    LOCATION:  [facility]      Dictated by (CST): Chilo Oakes MD on 10/23/2024 at 12:00 PM     Finalized by (CST): Chilo Oakes MD on 10/23/2024 at 12:01 PM         Operative reports:      Hospital course:    Patient is a 89 year old male with hx of HLD, HTN, GERD, hypothyroidism, TIA , hx of R CEA,  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      Plan:    Syncope  - monitor on tele   - check orthostatic BP - neg  - check for infection, UA - neg  - given hx of TIA, hx of SDH neuro consulted    - in the past thought to be 2/2 low HR, BB was stopped, cards to see   - MRI neg  - EEG neg  - outpt event monitor if having issues again  - neuro / cards saw     HLD  - statin     HTN  - pt on ace and amlodipine at home  -currently bp controlled wo meds   - hold meds at dc - ? If low BP was causing his symptoms      GERD  - ppi      Hypothyroidism   - synthroid      TIA  - home meds     Hx of R CEA  Hx of SDH  [facility]  - CTOH in ED no  acute issues noted      Day of discharge exam:  Vitals:    10/25/24 0914   BP: 131/42   Pulse: 72   Resp:    Temp:      Feels well    Wants to go home  No driving dw pt  Plna to dc if echo ok     No acute distress, alert and oriented   Lungs clear  Heart regular  Abdomen benign    Total time coordinating care 32 min      Patient and/or family had opportunity to ask questions and expressed understanding and agreement with therapeutic plan as outlined         [facility] Hospitalist  [phone]  Answering Service: [phone]

## 2024-10-25 NOTE — PLAN OF CARE
NURSING DISCHARGE NOTE    Discharged Home via Wheelchair.  Accompanied by Support staff  Belongings Taken by patient/family.  IV removed   Follow ups discussed  Medication changes discussed   Pt left unit in stable condition

## 2024-10-28 ENCOUNTER — PATIENT OUTREACH (OUTPATIENT)
Dept: CASE MANAGEMENT | Age: 89
End: 2024-10-28

## 2024-10-28 NOTE — PROGRESS NOTES
[facility] appointment request (discharged 10/25)    Dr Shahid Matthew  CARDIOLOGY  [address]   [address]   [facility] [address] [phone]  Follow up 2 weeks  Apt made:  Tue 11/12 @11:00am w/LILLIE Parrish  Confirmed w/pt  Closing encounter

## 2025-02-07 ENCOUNTER — APPOINTMENT (OUTPATIENT)
Dept: CT IMAGING | Facility: HOSPITAL | Age: OVER 89
End: 2025-02-07
Attending: EMERGENCY MEDICINE
Payer: MEDICARE

## 2025-02-07 ENCOUNTER — HOSPITAL ENCOUNTER (EMERGENCY)
Facility: HOSPITAL | Age: OVER 89
Discharge: HOME | End: 2025-02-08
Attending: EMERGENCY MEDICINE
Payer: MEDICARE
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
## 2025-02-07 DIAGNOSIS — S09.90XA INJURY OF HEAD, INITIAL ENCOUNTER: Primary | ICD-10-CM

## 2025-02-07 PROCEDURE — 99284 EMERGENCY DEPT VISIT MOD MDM: CPT

## 2025-02-07 PROCEDURE — 70450 CT HEAD/BRAIN W/O DYE: CPT | Performed by: EMERGENCY MEDICINE

## 2025-02-07 PROCEDURE — 99283 EMERGENCY DEPT VISIT LOW MDM: CPT

## 2025-02-08 VITALS
TEMPERATURE: 98 F | WEIGHT: 140 LBS | SYSTOLIC BLOOD PRESSURE: 154 MMHG | HEIGHT: 63 IN | DIASTOLIC BLOOD PRESSURE: 63 MMHG | HEART RATE: 66 BPM | BODY MASS INDEX: 24.8 KG/M2 | OXYGEN SATURATION: 97 % | RESPIRATION RATE: 16 BRPM

## 2025-02-08 NOTE — ED PROVIDER NOTES
Patient Seen in: [facility] Emergency Department      History     Chief Complaint   Patient presents with    Fall     Stated Complaint: fall yesterday    Subjective:   HPI      Patient is a 89-year-old male presenting to the ED after slipping and falling on the ice on Thursday morning while bringing in the garbage cans hitting his head.  The patient has had no headache, no nausea or vomiting, no weakness or loss of sensation, no neck pain, no change in vision, no dizziness.  He had no associated loss of consciousness.  However, he does have a history of intracranial hemorrhage requiring bur holes.  Patient is on aspirin.  No anticoagulation.  States that he and his family were concerned that he could have a recurrent hemorrhage    Objective:     Past Medical History:    Atherosclerosis of coronary artery    Cataract    Coronary atherosclerosis of unspecified type of vessel, native or graft    Esophageal reflux    GERD    Heart attack (HCC)    High blood pressure    High cholesterol    HYPERLIPIDEMIA    HYPERTENSION    HYPOTHYROIDISM    Osteoarthritis    OSTEOPENIA    SINUSITIS    Stroke (HCC)    tia    Subdural hematoma (HCC)    Unspecified disorder of thyroid    Unspecified essential hypertension    Visual impairment              Past Surgical History:   Procedure Laterality Date    Angioplasty (coronary)  1994 x 3 procedures    no stents    Carotid endarterectomy Right     Cataract Bilateral 2012    lens implants    Colonoscopy  11/04    Colonoscopy N/A 10/27/2014    Procedure: COLONOSCOPY;  Surgeon: Chilo Bruce MD;  Location:  ENDOSCOPY    Other      trigger finger release x 2 procedures    Skin surgery  02/26/2018    Exc - atyp nevus (mod-severe dysplasia) - R paramedian lower mid back    Tonsillectomy                  Social History     Socioeconomic History    Marital status:     Number of children: 3   Occupational History    Occupation: Retired   Tobacco Use    Smoking status: Former      Current packs/day: 0.00     Average packs/day: 1 pack/day for 40.0 years (40.0 ttl pk-yrs)     Types: Cigarettes     Start date: 6/15/1946     Quit date: 6/15/1986     Years since quittin.6    Smokeless tobacco: Never   Vaping Use    Vaping status: Never Used   Substance and Sexual Activity    Alcohol use: Not Currently     Comment: rare    Drug use: No    Sexual activity: Yes     Partners: Female   Other Topics Concern    Seat Belt Yes     Social Drivers of Health     Food Insecurity: No Food Insecurity (10/23/2024)    Food Insecurity     Food Insecurity: Never true   Transportation Needs: No Transportation Needs (10/23/2024)    Transportation Needs     Lack of Transportation: No   Housing Stability: Low Risk  (10/23/2024)    Housing Stability     Housing Instability: No                  Physical Exam     ED Triage Vitals   BP 25 2230 (!) 186/80   Pulse 25 2230 74   Resp 25 2230 16   Temp 25 2245 98.3 °F (36.8 °C)   Temp src 25 2245 Oral   SpO2 25 2230 100 %   O2 Device 25 2230 None (Room air)       Current Vitals:   Vital Signs  BP: 154/63  Pulse: 66  Resp: 16  Temp: 98.3 °F (36.8 °C)  Temp src: Oral  MAP (mmHg): 88    Oxygen Therapy  SpO2: 97 %  O2 Device: None (Room air)        Physical Exam  Vitals and nursing note reviewed.   Constitutional:       General: He is not in acute distress.     Appearance: Normal appearance. He is not ill-appearing.   HENT:      Head: Normocephalic.        Right Ear: External ear normal.      Left Ear: External ear normal.      Nose: Nose normal.      Mouth/Throat:      Mouth: Mucous membranes are moist.      Pharynx: Oropharynx is clear. No posterior oropharyngeal erythema.   Eyes:      Extraocular Movements: Extraocular movements intact.      Conjunctiva/sclera: Conjunctivae normal.      Pupils: Pupils are equal, round, and reactive to light.   Neck:      Comments: No midline cervical spine tenderness  Cardiovascular:      Rate and  Rhythm: Normal rate and regular rhythm.   Pulmonary:      Effort: Pulmonary effort is normal. No respiratory distress.      Breath sounds: Normal breath sounds.   Abdominal:      General: Abdomen is flat. Bowel sounds are normal. There is no distension.      Tenderness: There is no abdominal tenderness.   Musculoskeletal:      Cervical back: Normal range of motion and neck supple. No tenderness.      Right lower leg: No edema.      Left lower leg: No edema.   Skin:     General: Skin is warm.      Capillary Refill: Capillary refill takes less than 2 seconds.      Findings: No rash.   Neurological:      General: No focal deficit present.      Mental Status: He is alert and oriented to person, place, and time.      Sensory: No sensory deficit.      Motor: No weakness.   Psychiatric:         Mood and Affect: Mood normal.         Behavior: Behavior normal.             ED Course   Labs Reviewed - No data to display                MDM      History obtained from patient, family at bedside.     Differential diagnosis includes contusion to the hand, no symptoms of concussion, skull fracture or intracranial hemorrhage considered given patient's age and history    Previous records reviewed, patient was seen by neurosurgery and had craniotomy with urbano holes in 2019.    Testing considered and ordered includes CT brain      I also reviewed the official report which shows     CT BRAIN OR HEAD (CPT=70450)    Result Date: 2/7/2025  PROCEDURE:  CT BRAIN OR HEAD (27927)  COMPARISON:  [facility] , CT, CT BRAIN OR HEAD (29167), 10/23/2024, 12:18 PM.  INDICATIONS:  fall yesterday  TECHNIQUE:  Noncontrast CT scanning is performed through the brain. Dose reduction techniques were used. Dose information is transmitted to the ACR (American College of Radiology) NRDR (National Radiology Data Registry) which includes the Dose Index Registry.  PATIENT STATED HISTORY: (As transcribed by Technologist)  fall yesterday    FINDINGS:  No evidence of  acute intracranial hemorrhage, mass effect or midline shift.  There is mild diffuse atrophy and white matter disease which is stable/unchanged from the previous exam.  No hyperdense intracranial vessels noted.  The bony calvarium reveals no acute disease.  There is a stable urbano hole within the right posterior parietal region consistent with a prior subdural evacuation.  Please correlate with patient's history.  The paranasal sinuses and mastoid air cells are well pneumatized and unremarkable.            CONCLUSION:  1. No acute process. 2. Stable atrophy and white matter disease. 3. No significant interval changes are noted.    LOCATION:  [facility]   Dictated by (CST): Fany Phillips DO on 2/07/2025 at 11:14 PM     Finalized by (CST): Fany Phillips DO on 2/07/2025 at 11:16 PM        Discussed results with patient and family as well as plan for discharge, exercise caution when walking outside especially in the winter when there is ice or snow on the ground.  Return to ED if any symptoms worsen, persist, or new symptoms develop.  Otherwise outpatient follow-up with primary care provider for reevaluation.  Patient and family comfortable discharge plan.  Medical Decision Making      Disposition and Plan     Clinical Impression:  1. Injury of head, initial encounter    2. Fall due to slipping on ice or snow, initial encounter         Disposition:  Discharge  2/8/2025  1:15 am    Follow-up:  Joseph Helms MD  [address] [facility] [address]  [address] [place] [id number] [phone]    Schedule an appointment as soon as possible for a visit in 2 day(s)      [facility] Emergency Department  [facility] [address] [facility] [address]  [phone]  Follow up  IF SYMPTOMS WORSEN, PERSIST, OR NEW SYMPTOMS DEVEL          Medications Prescribed:  Discharge Medication List as of 2/8/2025  1:28 AM              Supplementary Documentation:

## 2025-02-08 NOTE — ED INITIAL ASSESSMENT (HPI)
Pt arrives via EMS from home, fell backward and hit head. Denies any symptoms but had brain bleed several years ago and wants CT \"for peace of mind\". Takes aspirin. A&O x4 currently.

## (undated) DEVICE — CODMAN BACTISEAL EVD CATH SET: Type: IMPLANTABLE DEVICE | Site: HEAD

## (undated) DEVICE — SUTURE ETHILON 3-0 PS-1

## (undated) DEVICE — PAD SACRAL SPAN AID

## (undated) DEVICE — SPECIMEN TRAP LUKI

## (undated) DEVICE — GAMMEX® PI HYBRID SIZE 7.5, STERILE POWDER-FREE SURGICAL GLOVE, POLYISOPRENE AND NEOPRENE BLEND: Brand: GAMMEX

## (undated) DEVICE — GLOVE SURG TRIUMPH SZ 71/2

## (undated) DEVICE — SOLUTION ANSEP 70% ISOPRPNL

## (undated) DEVICE — OIL CARTRIDGE: Brand: CORE, MAESTRO

## (undated) DEVICE — SUTURE VICRYL 2-0 CP-2

## (undated) DEVICE — GLOVE SURG TRIUMPH SZ 8

## (undated) DEVICE — SPECIMEN CONTAINER,POSITIVE SEAL INDICATOR, OR PACKAGED: Brand: PRECISION

## (undated) DEVICE — CODMAN® DISPOSABLE PERFORATOR 14MM: Brand: CODMAN®

## (undated) DEVICE — SUTURE VICRYL 0 CT-1

## (undated) DEVICE — CRANIOTOMY CDS: Brand: MEDLINE INDUSTRIES, INC.

## (undated) DEVICE — DIFFUSER: Brand: CORE, MAESTRO

## (undated) DEVICE — KENDALL SCD EXPRESS SLEEVES, THIGH LENGTH, MEDIUM: Brand: KENDALL SCD

## (undated) DEVICE — FLOSEAL HEMOSTATIC MATRIX, 5ML: Brand: FLOSEAL HEMOSTATIC MATRIX

## (undated) DEVICE — SOL  .9 1000ML BTL

## (undated) DEVICE — TRANSPOSAL ULTRAFLEX DUO/QUAD ULTRA CART MANIFOLD

## (undated) NOTE — LETTER
Alissa Porter [address]  [place], [address] [facility]    Consent for Operation  Date: __________________                                Time: _______________    1.  I authorize the performance upon Wyatt Romeo. the following operation:  Procedu procedure has been videotaped, the surgeon will obtain the original videotape. The hospital will not be responsible for storage or maintenance of this tape.   7. For the purpose of advancing medical education, I consent to the admittance of observers to the STATEMENTS REQUIRING INSERTION OR COMPLETION WERE FILLED IN.     Signature of Patient:   ___________________________    When the patient is a minor or mentally incompetent to give consent:  Signature of person authorized to consent for patient: ____________ supplements, and pills I can buy without a prescription (including street drugs/illegal medications). Failure to inform my anesthesiologist about these medicines may increase my risk of anesthetic complications. iv.  If I am allergic to anything or have ha Anesthesiologist Signature     Date   Time  I have discussed the procedure and information above with the patient (or patient’s representative) and answered their questions. The patient or their representative has agreed to have anesthesia services.     ___